# Patient Record
Sex: MALE | Race: BLACK OR AFRICAN AMERICAN | Employment: UNEMPLOYED | URBAN - METROPOLITAN AREA
[De-identification: names, ages, dates, MRNs, and addresses within clinical notes are randomized per-mention and may not be internally consistent; named-entity substitution may affect disease eponyms.]

---

## 2017-06-15 ENCOUNTER — APPOINTMENT (OUTPATIENT)
Dept: CT IMAGING | Age: 52
DRG: 871 | End: 2017-06-15
Attending: EMERGENCY MEDICINE
Payer: SUBSIDIZED

## 2017-06-15 ENCOUNTER — HOSPITAL ENCOUNTER (INPATIENT)
Age: 52
LOS: 7 days | Discharge: HOME | DRG: 871 | End: 2017-06-22
Attending: EMERGENCY MEDICINE | Admitting: INTERNAL MEDICINE
Payer: SUBSIDIZED

## 2017-06-15 ENCOUNTER — APPOINTMENT (OUTPATIENT)
Dept: GENERAL RADIOLOGY | Age: 52
DRG: 871 | End: 2017-06-15
Attending: EMERGENCY MEDICINE
Payer: SUBSIDIZED

## 2017-06-15 DIAGNOSIS — J18.9 COMMUNITY ACQUIRED PNEUMONIA: ICD-10-CM

## 2017-06-15 DIAGNOSIS — R09.02 HYPOXIA: ICD-10-CM

## 2017-06-15 DIAGNOSIS — A41.9 SEPSIS, DUE TO UNSPECIFIED ORGANISM: Primary | ICD-10-CM

## 2017-06-15 PROBLEM — J96.01 ACUTE RESPIRATORY FAILURE WITH HYPOXIA (HCC): Status: ACTIVE | Noted: 2017-06-15

## 2017-06-15 PROBLEM — R79.89 D-DIMER, ELEVATED: Status: ACTIVE | Noted: 2017-06-15

## 2017-06-15 LAB
ALBUMIN SERPL BCP-MCNC: 2.3 G/DL (ref 3.5–5)
ALBUMIN/GLOB SERPL: 0.5 {RATIO} (ref 1.1–2.2)
ALP SERPL-CCNC: 189 U/L (ref 45–117)
ALT SERPL-CCNC: 55 U/L (ref 12–78)
ANION GAP BLD CALC-SCNC: 9 MMOL/L (ref 5–15)
APPEARANCE UR: CLEAR
AST SERPL W P-5'-P-CCNC: 65 U/L (ref 15–37)
ATRIAL RATE: 112 BPM
BACTERIA URNS QL MICRO: NEGATIVE /HPF
BASOPHILS # BLD AUTO: 0 K/UL (ref 0–0.1)
BASOPHILS # BLD: 0 % (ref 0–1)
BILIRUB SERPL-MCNC: 1 MG/DL (ref 0.2–1)
BILIRUB UR QL CFM: NEGATIVE
BUN SERPL-MCNC: 24 MG/DL (ref 6–20)
BUN/CREAT SERPL: 17 (ref 12–20)
CALCIUM SERPL-MCNC: 8.2 MG/DL (ref 8.5–10.1)
CALCULATED P AXIS, ECG09: 44 DEGREES
CALCULATED R AXIS, ECG10: 38 DEGREES
CALCULATED T AXIS, ECG11: 22 DEGREES
CHLORIDE SERPL-SCNC: 92 MMOL/L (ref 97–108)
CO2 SERPL-SCNC: 29 MMOL/L (ref 21–32)
COLOR UR: ABNORMAL
CREAT SERPL-MCNC: 1.42 MG/DL (ref 0.7–1.3)
D DIMER PPP FEU-MCNC: 2.76 MG/L FEU (ref 0–0.65)
DIAGNOSIS, 93000: NORMAL
DIFFERENTIAL METHOD BLD: ABNORMAL
EOSINOPHIL # BLD: 0.2 K/UL (ref 0–0.4)
EOSINOPHIL NFR BLD: 1 % (ref 0–7)
EPITH CASTS URNS QL MICRO: ABNORMAL /LPF
ERYTHROCYTE [DISTWIDTH] IN BLOOD BY AUTOMATED COUNT: 13.6 % (ref 11.5–14.5)
FLUAV AG NPH QL IA: NEGATIVE
FLUBV AG NOSE QL IA: NEGATIVE
GLOBULIN SER CALC-MCNC: 4.8 G/DL (ref 2–4)
GLUCOSE SERPL-MCNC: 114 MG/DL (ref 65–100)
GLUCOSE UR STRIP.AUTO-MCNC: NEGATIVE MG/DL
HCT VFR BLD AUTO: 30.7 % (ref 36.6–50.3)
HGB BLD-MCNC: 11 G/DL (ref 12.1–17)
HGB UR QL STRIP: NEGATIVE
HYALINE CASTS URNS QL MICRO: ABNORMAL /LPF (ref 0–5)
KETONES UR QL STRIP.AUTO: NEGATIVE MG/DL
LACTATE SERPL-SCNC: 1.1 MMOL/L (ref 0.4–2)
LEUKOCYTE ESTERASE UR QL STRIP.AUTO: ABNORMAL
LYMPHOCYTES # BLD AUTO: 4 % (ref 12–49)
LYMPHOCYTES # BLD: 0.8 K/UL (ref 0.8–3.5)
MCH RBC QN AUTO: 31.3 PG (ref 26–34)
MCHC RBC AUTO-ENTMCNC: 35.8 G/DL (ref 30–36.5)
MCV RBC AUTO: 87.2 FL (ref 80–99)
MONOCYTES # BLD: 0.8 K/UL (ref 0–1)
MONOCYTES NFR BLD AUTO: 4 % (ref 5–13)
NEUTS SEG # BLD: 17.3 K/UL (ref 1.8–8)
NEUTS SEG NFR BLD AUTO: 91 % (ref 32–75)
NITRITE UR QL STRIP.AUTO: NEGATIVE
P-R INTERVAL, ECG05: 118 MS
PH UR STRIP: 5 [PH] (ref 5–8)
PLATELET # BLD AUTO: 309 K/UL (ref 150–400)
POTASSIUM SERPL-SCNC: 2.8 MMOL/L (ref 3.5–5.1)
PROT SERPL-MCNC: 7.1 G/DL (ref 6.4–8.2)
PROT UR STRIP-MCNC: NEGATIVE MG/DL
Q-T INTERVAL, ECG07: 314 MS
QRS DURATION, ECG06: 76 MS
QTC CALCULATION (BEZET), ECG08: 428 MS
RBC # BLD AUTO: 3.52 M/UL (ref 4.1–5.7)
RBC #/AREA URNS HPF: ABNORMAL /HPF (ref 0–5)
RBC MORPH BLD: ABNORMAL
SODIUM SERPL-SCNC: 130 MMOL/L (ref 136–145)
SP GR UR REFRACTOMETRY: 1.03 (ref 1–1.03)
UA: UC IF INDICATED,UAUC: ABNORMAL
UROBILINOGEN UR QL STRIP.AUTO: 2 EU/DL (ref 0.2–1)
VENTRICULAR RATE, ECG03: 112 BPM
WBC # BLD AUTO: 19.1 K/UL (ref 4.1–11.1)
WBC MORPH BLD: ABNORMAL
WBC URNS QL MICRO: ABNORMAL /HPF (ref 0–4)

## 2017-06-15 PROCEDURE — 85025 COMPLETE CBC W/AUTO DIFF WBC: CPT | Performed by: EMERGENCY MEDICINE

## 2017-06-15 PROCEDURE — 87040 BLOOD CULTURE FOR BACTERIA: CPT | Performed by: EMERGENCY MEDICINE

## 2017-06-15 PROCEDURE — 96361 HYDRATE IV INFUSION ADD-ON: CPT

## 2017-06-15 PROCEDURE — 74011250636 HC RX REV CODE- 250/636: Performed by: INTERNAL MEDICINE

## 2017-06-15 PROCEDURE — 65660000000 HC RM CCU STEPDOWN

## 2017-06-15 PROCEDURE — 71010 XR CHEST PORT: CPT

## 2017-06-15 PROCEDURE — 77010033678 HC OXYGEN DAILY

## 2017-06-15 PROCEDURE — 85379 FIBRIN DEGRADATION QUANT: CPT | Performed by: EMERGENCY MEDICINE

## 2017-06-15 PROCEDURE — 87804 INFLUENZA ASSAY W/OPTIC: CPT | Performed by: INTERNAL MEDICINE

## 2017-06-15 PROCEDURE — 96374 THER/PROPH/DIAG INJ IV PUSH: CPT

## 2017-06-15 PROCEDURE — 80053 COMPREHEN METABOLIC PANEL: CPT | Performed by: EMERGENCY MEDICINE

## 2017-06-15 PROCEDURE — 81001 URINALYSIS AUTO W/SCOPE: CPT | Performed by: INTERNAL MEDICINE

## 2017-06-15 PROCEDURE — 83605 ASSAY OF LACTIC ACID: CPT | Performed by: EMERGENCY MEDICINE

## 2017-06-15 PROCEDURE — 74011250636 HC RX REV CODE- 250/636: Performed by: EMERGENCY MEDICINE

## 2017-06-15 PROCEDURE — 74011250637 HC RX REV CODE- 250/637: Performed by: INTERNAL MEDICINE

## 2017-06-15 PROCEDURE — 96375 TX/PRO/DX INJ NEW DRUG ADDON: CPT

## 2017-06-15 PROCEDURE — 74011636320 HC RX REV CODE- 636/320: Performed by: EMERGENCY MEDICINE

## 2017-06-15 PROCEDURE — 36415 COLL VENOUS BLD VENIPUNCTURE: CPT | Performed by: EMERGENCY MEDICINE

## 2017-06-15 PROCEDURE — 99285 EMERGENCY DEPT VISIT HI MDM: CPT

## 2017-06-15 PROCEDURE — 93005 ELECTROCARDIOGRAM TRACING: CPT

## 2017-06-15 PROCEDURE — 71275 CT ANGIOGRAPHY CHEST: CPT

## 2017-06-15 PROCEDURE — 74011250637 HC RX REV CODE- 250/637: Performed by: EMERGENCY MEDICINE

## 2017-06-15 PROCEDURE — 74011000258 HC RX REV CODE- 258: Performed by: EMERGENCY MEDICINE

## 2017-06-15 PROCEDURE — 77030027138 HC INCENT SPIROMETER -A

## 2017-06-15 RX ORDER — SODIUM CHLORIDE 9 MG/ML
125 INJECTION, SOLUTION INTRAVENOUS CONTINUOUS
Status: DISCONTINUED | OUTPATIENT
Start: 2017-06-15 | End: 2017-06-17

## 2017-06-15 RX ORDER — SODIUM CHLORIDE 0.9 % (FLUSH) 0.9 %
5-10 SYRINGE (ML) INJECTION AS NEEDED
Status: DISCONTINUED | OUTPATIENT
Start: 2017-06-15 | End: 2017-06-22 | Stop reason: SDUPTHER

## 2017-06-15 RX ORDER — SODIUM CHLORIDE 9 MG/ML
50 INJECTION, SOLUTION INTRAVENOUS
Status: COMPLETED | OUTPATIENT
Start: 2017-06-15 | End: 2017-06-15

## 2017-06-15 RX ORDER — SODIUM CHLORIDE 0.9 % (FLUSH) 0.9 %
5-10 SYRINGE (ML) INJECTION AS NEEDED
Status: DISCONTINUED | OUTPATIENT
Start: 2017-06-15 | End: 2017-06-22 | Stop reason: HOSPADM

## 2017-06-15 RX ORDER — KETOROLAC TROMETHAMINE 30 MG/ML
30 INJECTION, SOLUTION INTRAMUSCULAR; INTRAVENOUS
Status: COMPLETED | OUTPATIENT
Start: 2017-06-15 | End: 2017-06-15

## 2017-06-15 RX ORDER — SODIUM CHLORIDE 0.9 % (FLUSH) 0.9 %
5-10 SYRINGE (ML) INJECTION EVERY 8 HOURS
Status: DISCONTINUED | OUTPATIENT
Start: 2017-06-15 | End: 2017-06-22 | Stop reason: HOSPADM

## 2017-06-15 RX ORDER — IPRATROPIUM BROMIDE AND ALBUTEROL SULFATE 2.5; .5 MG/3ML; MG/3ML
3 SOLUTION RESPIRATORY (INHALATION)
Status: DISCONTINUED | OUTPATIENT
Start: 2017-06-15 | End: 2017-06-22 | Stop reason: HOSPADM

## 2017-06-15 RX ORDER — GUAIFENESIN/DEXTROMETHORPHAN 100-10MG/5
10 SYRUP ORAL
Status: DISCONTINUED | OUTPATIENT
Start: 2017-06-15 | End: 2017-06-22 | Stop reason: HOSPADM

## 2017-06-15 RX ORDER — SODIUM CHLORIDE 0.9 % (FLUSH) 0.9 %
10 SYRINGE (ML) INJECTION
Status: COMPLETED | OUTPATIENT
Start: 2017-06-15 | End: 2017-06-15

## 2017-06-15 RX ORDER — ONDANSETRON 2 MG/ML
4 INJECTION INTRAMUSCULAR; INTRAVENOUS
Status: DISCONTINUED | OUTPATIENT
Start: 2017-06-15 | End: 2017-06-22 | Stop reason: HOSPADM

## 2017-06-15 RX ORDER — ENOXAPARIN SODIUM 100 MG/ML
40 INJECTION SUBCUTANEOUS EVERY 24 HOURS
Status: DISCONTINUED | OUTPATIENT
Start: 2017-06-15 | End: 2017-06-22 | Stop reason: HOSPADM

## 2017-06-15 RX ORDER — ACETAMINOPHEN 325 MG/1
975 TABLET ORAL
Status: COMPLETED | OUTPATIENT
Start: 2017-06-15 | End: 2017-06-15

## 2017-06-15 RX ORDER — POTASSIUM CHLORIDE 20 MEQ/1
40 TABLET, EXTENDED RELEASE ORAL
Status: COMPLETED | OUTPATIENT
Start: 2017-06-15 | End: 2017-06-15

## 2017-06-15 RX ORDER — ACETAMINOPHEN 325 MG/1
650 TABLET ORAL
Status: DISCONTINUED | OUTPATIENT
Start: 2017-06-15 | End: 2017-06-22 | Stop reason: HOSPADM

## 2017-06-15 RX ORDER — GUAIFENESIN 600 MG/1
600 TABLET, EXTENDED RELEASE ORAL EVERY 12 HOURS
Status: DISCONTINUED | OUTPATIENT
Start: 2017-06-15 | End: 2017-06-22 | Stop reason: HOSPADM

## 2017-06-15 RX ORDER — HYDROCODONE BITARTRATE AND ACETAMINOPHEN 5; 325 MG/1; MG/1
1 TABLET ORAL
Status: DISCONTINUED | OUTPATIENT
Start: 2017-06-15 | End: 2017-06-21

## 2017-06-15 RX ADMIN — IOPAMIDOL 80 ML: 755 INJECTION, SOLUTION INTRAVENOUS at 06:42

## 2017-06-15 RX ADMIN — Medication 10 ML: at 13:44

## 2017-06-15 RX ADMIN — GUAIFENESIN 600 MG: 600 TABLET, EXTENDED RELEASE ORAL at 22:26

## 2017-06-15 RX ADMIN — GUAIFENESIN 600 MG: 600 TABLET, EXTENDED RELEASE ORAL at 09:12

## 2017-06-15 RX ADMIN — SODIUM CHLORIDE 100 ML/HR: 900 INJECTION, SOLUTION INTRAVENOUS at 06:53

## 2017-06-15 RX ADMIN — ACETAMINOPHEN 975 MG: 325 TABLET, FILM COATED ORAL at 05:45

## 2017-06-15 RX ADMIN — AZITHROMYCIN MONOHYDRATE 500 MG: 500 INJECTION, POWDER, LYOPHILIZED, FOR SOLUTION INTRAVENOUS at 06:59

## 2017-06-15 RX ADMIN — POTASSIUM CHLORIDE 40 MEQ: 20 TABLET, EXTENDED RELEASE ORAL at 06:59

## 2017-06-15 RX ADMIN — POTASSIUM CHLORIDE 40 MEQ: 20 TABLET, EXTENDED RELEASE ORAL at 09:12

## 2017-06-15 RX ADMIN — Medication 10 ML: at 22:26

## 2017-06-15 RX ADMIN — SODIUM CHLORIDE 50 ML/HR: 900 INJECTION, SOLUTION INTRAVENOUS at 06:41

## 2017-06-15 RX ADMIN — ENOXAPARIN SODIUM 40 MG: 40 INJECTION SUBCUTANEOUS at 09:12

## 2017-06-15 RX ADMIN — CEFTRIAXONE SODIUM 2 G: 2 INJECTION, POWDER, FOR SOLUTION INTRAMUSCULAR; INTRAVENOUS at 06:16

## 2017-06-15 RX ADMIN — KETOROLAC TROMETHAMINE 30 MG: 30 INJECTION, SOLUTION INTRAMUSCULAR at 05:45

## 2017-06-15 RX ADMIN — SODIUM CHLORIDE 125 ML/HR: 900 INJECTION, SOLUTION INTRAVENOUS at 15:30

## 2017-06-15 RX ADMIN — Medication 10 ML: at 06:41

## 2017-06-15 RX ADMIN — HYDROCODONE BITARTRATE AND ACETAMINOPHEN 1 TABLET: 5; 325 TABLET ORAL at 18:19

## 2017-06-15 RX ADMIN — HYDROCODONE BITARTRATE AND ACETAMINOPHEN 1 TABLET: 5; 325 TABLET ORAL at 22:26

## 2017-06-15 RX ADMIN — SODIUM CHLORIDE 1980 ML: 900 INJECTION, SOLUTION INTRAVENOUS at 05:20

## 2017-06-15 NOTE — IP AVS SNAPSHOT
[address] [facility] 
[phone] Patient: Estevan Mast MRN: [id number] :1965 You are allergic to the following No active allergies Recent Documentation Height Weight BMI  
  
  
 1.727 m 64.5 kg 21.62 kg/m2 Unresulted Labs Order Current Status CULTURE, BODY FLUID W GRAM STAIN Preliminary result About your hospitalization You were admitted on:  Loni 15, 2017 You last received care in the:  [facility] 2 GENERAL SURGERY You were discharged on:  2017 Unit phone number:  [phone] Why you were hospitalized Your primary diagnosis was:  Not on File Your diagnoses also included:  Sepsis (Hcc), Acute Respiratory Failure With Hypoxia (Hcc), Community Acquired Pneumonia, D-Dimer, Elevated, Pleural Effusion, Left Providers Seen During Your Hospitalizations Provider Role Specialty Primary office phone Ritu Yancey MD Attending Provider Emergency Medicine [phone] Brandon Forbes MD Attending Provider Internal Medicine [phone] Calista Mccormack MD Attending Provider Internal Medicine [phone] Your Primary Care Physician (PCP) Primary Care Physician Office Phone Office Fax NONE ** None ** ** None ** Follow-up Information Follow up With Details Comments Contact Info None   None ([phone]) Patient stated that they have no PCP Current Discharge Medication List  
  
START taking these medications Dose & Instructions Dispensing Information Comments Morning Noon Evening Bedtime  
 guaiFENesin  mg ER tablet Commonly known as:  Sonido & Sonido Your last dose was: Your next dose is:    
   
   
 Dose:  600 mg Take 1 Tab by mouth every twelve (12) hours for 7 days. For cough; do not have finish this course Quantity:  14 Tab Refills:  0 HYDROcodone-acetaminophen 5-325 mg per tablet Commonly known as:  Burnett Littler Your last dose was: Your next dose is:    
   
   
 Dose:  1-2 Tab Take 1-2 Tabs by mouth every four (4) hours as needed. Max Daily Amount: 12 Tabs. Quantity:  10 Tab Refills:  0  
     
   
   
   
  
 L. acidoph & paracasei- S therm- Bifido 8 billion cell Cap cap Commonly known as:  NORBERTO-Q/RISAQUAD Your last dose was: Your next dose is:    
   
   
 Dose:  1 Cap Take 1 Cap by mouth daily. Quantity:  10 Cap Refills:  0  
     
   
   
   
  
 levoFLOXacin 750 mg tablet Commonly known as:  Mellody Plump Your last dose was: Your next dose is:    
   
   
 Dose:  750 mg Take 1 Tab by mouth daily. Quantity:  10 Tab Refills:  0  
     
   
   
   
  
 metroNIDAZOLE 500 mg tablet Commonly known as:  FLAGYL Your last dose was: Your next dose is:    
   
   
 Dose:  500 mg Take 1 Tab by mouth three (3) times daily for 10 days. Quantity:  30 Tab Refills:  0 Where to Get Your Medications Information on where to get these meds will be given to you by the nurse or doctor. ! Ask your nurse or doctor about these medications  
  guaiFENesin  mg ER tablet HYDROcodone-acetaminophen 5-325 mg per tablet L. acidoph & paracasei- S therm- Bifido 8 billion cell Cap cap  
 levoFLOXacin 750 mg tablet  
 metroNIDAZOLE 500 mg tablet Discharge Instructions HOSPITALIST DISCHARGE INSTRUCTIONS 
 
NAME: Jyoti Hernandez :  1965 MRN:  [id number] Date/Time:  2017 11:30 AM 
 
ADMIT DATE: 6/15/2017 DISCHARGE DATE: 2017 Attending Physician: Rekha Otero MD 
 
DISCHARGE DIAGNOSIS: 
PNA community acquired Empyema L with pleural effusion S/p sepsis S/p acute resp failure Pleuritic chest pain S/p acute renal failure MEDICATIONS: 
See above · It is important that you take the medication exactly as they are prescribed. · Keep your medication in the bottles provided by the pharmacist and keep a list of the medication names, dosages, and times to be taken in your wallet. · Do not take other medications without consulting your doctor. Pain Management: per above medications What to do at [facility] Recommended diet:  Regular Diet Recommended activity: Activity as tolerated Follow Up: Follow-up Information None  
  
you need to follow up with a PCP in 7-10 days. Please choose one. If you have questions regarding the hospital related prescriptions or hospital related issues please call [facility] at . You can always direct your questions to your primary care doctor if you are unable to reach your hospital physician; your PCP works as an extension of your hospital doctor just like your hospital doctor is an extension of your PCP for your time at [address] If you experience any of the following symptoms then please call your primary care physician or return to the emergency room if you cannot get hold of your doctor: 
Fever, chills, nausea, vomiting, diarrhea, change in mentation, falling, bleeding, shortness of breath Additional Instructions: 
 
 
Bring these papers with you to your follow up appointments. The papers will help your doctors be sure to continue the care plan from the hospital. 
 
 
 
 
 
 
Information obtained by : 
I understand that if any problems occur once I am at home I am to contact my physician. I understand and acknowledge receipt of the instructions indicated above. Physician's or R.N.'s Signature                                                                  Date/Time Patient or Representative Signature                                                          Da 
 
Discharge Orders None [employer] Announcement We are excited to announce that we are making your provider's discharge notes available to you in [employer]. You will see these notes when they are completed and signed by the physician that discharged you from your recent hospital stay. If you have any questions or concerns about any information you see in [employer], please call the Health Information Department where you were seen or reach out to your Primary Care Provider for more information about your plan of care. Introducing [facility] [place] [facility]! [employer] introduces [employer] patient portal. Now you can access parts of your medical record, email your doctor's office, and request medication refills online. 1. In your internet browser, go to [email or web address] 2. Click on the First Time User? Click Here link in the Sign In box. You will see the New Member Sign Up page. 3. Enter your [employer] Access Code exactly as it appears below. You will not need to use this code after youve completed the sign-up process. If you do not sign up before the expiration date, you must request a new code. · [employer] Access Code: [email or web address] Expires: 9/20/2017 12:32 PM 
 
4. Enter the last four digits of your Social Security Number (xxxx) and Date of Birth (mm/dd/yyyy) as indicated and click Submit. You will be taken to the next sign-up page. 5. Create a [employer] ID. This will be your [employer] login ID and cannot be changed, so think of one that is secure and easy to remember. 6. Create a [employer] password. You can change your password at any time. 7. Enter your Password Reset Question and Answer. This can be used at a later time if you forget your password. 8. Enter your e-mail address. You will receive e-mail notification when new information is available in [address] 9. Click Sign Up. You can now view and download portions of your medical record. 10. Click the Download Summary menu link to download a portable copy of your medical information. If you have questions, please visit the Frequently Asked Questions section of the [employer] website. Remember, [employer] is NOT to be used for urgent needs. For medical emergencies, dial 911. Now available from your iPhone and Android! General Information Please provide this summary of care documentation to your next provider. Patient Signature:  ____________________________________________________________ Date:  ____________________________________________________________  
  
Regine Berumenan Provider Signature:  ____________________________________________________________ Date:  ____________________________________________________________

## 2017-06-15 NOTE — PROGRESS NOTES
End of Shift Nursing Note    Bedside shift change report given to [address] [place] [address] (oncoming nurse) by Lilli Bridges. Uriel Cage RN (offgoing nurse). Report included the following information SBAR. [facility] Phone:   [phone]    Significant changes during shift:    none   Non-emergent issues for physician to address:   none     Number times ambulated in hallway past shift: in room      Number of times OOB to chair past shift: 0    POD #: none, ER admit today     Vital Signs:    Temp: 97.5 °F (36.4 °C)     Pulse (Heart Rate): 66     BP: (!) 87/51     Resp Rate: 16     O2 Sat (%): 99 %    Lines & Drains:     Urinary Catheter? No     Central Line? No     PICC Line? No       NG tube [] in [] removed [x] not applicable   Drains [] in [] removed [x] not applicable     Skin Integrity:      Wounds: no   Dressings Present: no    Wound Concerns: no      GI:    Current diet:  DIET REGULAR    Nausea: NO  Vomiting: NO  Bowel Sounds: YES  Flatus: YES  Last Bowel Movement: 6/13/17   Appearance: soft    Respiratory:  Supplemental O2: Yes      Device: NC   via 6 Liters/min     Incentive Spirometer: YES  Volume: fair  Coughing and Deep Breathing: YES  Oral Care: YES  Understanding (patient/family education): YES   Getting out of bed: YES  Head of bed elevation: YES    Patient Safety:    Falls Score: 1  Mobility Score: 1  Bed Alarm On? No  Sitter? No      Opportunity for questions and clarification was given to oncoming nurse. Patient bed is in supine position, side rails are up x 2, door & observation blinds open as needed, call bell within reach and patient not in distress.     Lieutenant Raul RN

## 2017-06-15 NOTE — PROGRESS NOTES
TRANSFER - IN REPORT:    Verbal report received from Maria Dolores Rn(name) on Jillian Akira  being received from ER(unit) for routine progression of care      Report consisted of patients Situation, Background, Assessment and   Recommendations(SBAR). Information from the following report(s) SBAR was reviewed with the receiving nurse. Opportunity for questions and clarification was provided. Assessment completed upon patients arrival to unit and care assumed.

## 2017-06-15 NOTE — H&P
Hospitalist Admission Note    NAME: Mehnaz King   :  1965   MRN:  [id number]     Date/Time:  6/15/2017 6:24 AM    Patient PCP: No primary care provider on file.  ________________________________________________________________________    My assessment of this patient's clinical condition and my plan of care is as follows. Assessment / Plan:  Sepsis with fever, leukocytosis and tachycardia POA  Acute respiratory failure with hypoxia  Pleuritic chest pain  Due to CAP  IV Rocephin and Zithromax started in ED, will continue  Check Sputum cultures  F/u blood cultures  IVF  LA normal  O2 support  PRN nebs  PRN Norco  UA pending    Mild NARINDER  Hyponatremia  Seems related to dehydration  IVF and monitor lytes    D-dimer, elevated  ED ordered CTA chest, GFR is ok considering non AAM    Code Status: Full  Surrogate Decision Maker: Wife    DVT Prophylaxis: Lovenox  GI Prophylaxis: not indicated    Baseline: functional        Subjective:   CHIEF COMPLAINT: pleuritic chest pain    HISTORY OF PRESENT ILLNESS:     Mehnaz King is a 47 y.o.  male who presents with pleuritic chest pain. As per patient, he is been feeling sick for past 2 weeks. He claims to have started with cough which is productive with brown sputum. He also reports 8/10 pain upon deep breathing, hurting and lower chest, non radiating, sharp to aching in nature. Pt also reports subjective fevers, chills, sweats at home. Pt denies any nausea, vomiting, diarrhea, abdominal pain. In ED pt noted to have LLL PNA on CXR, fever, tachycardia and leukocytosis. We were asked to admit for work up and evaluation of the above problems.      Past medical history: he is unaware about any medical problems as he doesn't follow any doctors    Past surgical history: he denies any history of surgeries in the past    Social History   Substance Use Topics    Smoking status: Claims to have quit and now smokes cigar    Smokeless tobacco: Not on file    Alcohol use denies        Family history: negative for lung problems    No Known Allergies     Prior to Admission medications    Not on File       REVIEW OF SYSTEMS:     I am not able to complete the review of systems because: The patient is intubated and sedated    The patient has altered mental status due to his acute medical problems    The patient has baseline aphasia from prior stroke(s)    The patient has baseline dementia and is not reliable historian    The patient is in acute medical distress and unable to provide information           Total of 12 systems reviewed as follows:       POSITIVE= underlined text  Negative = text not underlined  General:  fever, chills, sweats, generalized weakness, weight loss/gain,      loss of appetite   Eyes:    blurred vision, eye pain, loss of vision, double vision  ENT:    rhinorrhea, pharyngitis   Respiratory:   cough, sputum production, SOB, BERG, wheezing, pleuritic pain   Cardiology:   chest pain, palpitations, orthopnea, PND, edema, syncope   Gastrointestinal:  abdominal pain , N/V, diarrhea, dysphagia, constipation, bleeding   Genitourinary:  frequency, urgency, dysuria, hematuria, incontinence   Muskuloskeletal :  arthralgia, myalgia, back pain  Hematology:  easy bruising, nose or gum bleeding, lymphadenopathy   Dermatological: rash, ulceration, pruritis, color change / jaundice  Endocrine:   hot flashes or polydipsia   Neurological:  headache, dizziness, confusion, focal weakness, paresthesia,     Speech difficulties, memory loss, gait difficulty  Psychological: Feelings of anxiety, depression, agitation    Objective:   VITALS:    Visit Vitals    /60    Pulse 99    Temp (!) 103.2 °F (39.6 °C)    Resp 15    Ht 5' 8\" (1.727 m)    Wt 64.5 kg (142 lb 3.2 oz)    SpO2 93%    BMI 21.62 kg/m2       PHYSICAL EXAM:    General:    Alert, cooperative, no distress, appears stated age.      HEENT: Atraumatic, anicteric sclerae, pink conjunctivae     No oral ulcers, mucosa dry, throat clear, dentition fair  Neck:  Supple, symmetrical,  thyroid: non tender  Lungs:   Crackles at the basis. No Wheezing or Rhonchi. No rales. Chest wall:  No tenderness  No Accessory muscle use. Heart:   Regular  rhythm,  No  murmur   No edema  Abdomen:   Soft, non-tender. Not distended. Bowel sounds normal  Extremities: No cyanosis. No clubbing,      Skin turgor normal, Capillary refill normal, Radial dial pulse 2+  Skin:     Not pale. Not Jaundiced  No rashes   Psych:  Good insight. Not depressed. Not anxious or agitated. Neurologic: EOMs intact. No facial asymmetry. No aphasia or slurred speech. Symmetrical strength, Sensation grossly intact. Alert and oriented X 4.     _______________________________________________________________________  Care Plan discussed with:    Comments   Patient y    Family      RN y    Care Manager                    Consultant:  karina ED physician   _______________________________________________________________________  Expected  Disposition:   Home with Family y   HH/PT/OT/RN    SNF/LTC    RHIANNA    ________________________________________________________________________  TOTAL TIME: 61 Minutes    Critical Care Provided     Minutes non procedure based      Comments    y Reviewed previous records   >50% of visit spent in counseling and coordination of care y Discussion with patient and questions answered       ________________________________________________________________________  Signed: Jarrod Boyd MD    Procedures: see electronic medical records for all procedures/Xrays and details which were not copied into this note but were reviewed prior to creation of Plan.     LAB DATA REVIEWED:    Recent Results (from the past 24 hour(s))   LACTIC ACID, PLASMA    Collection Time: 06/15/17  5:21 AM   Result Value Ref Range    Lactic acid 1.1 0.4 - 2.0 MMOL/L   CBC WITH AUTOMATED DIFF    Collection Time: 06/15/17  5:21 AM   Result Value Ref Range    WBC 19.1 (H) 4.1 - 11.1 K/uL    RBC 3.52 (L) 4.10 - 5.70 M/uL    HGB 11.0 (L) 12.1 - 17.0 g/dL    HCT 30.7 (L) 36.6 - 50.3 %    MCV 87.2 80.0 - 99.0 FL    MCH 31.3 26.0 - 34.0 PG    MCHC 35.8 30.0 - 36.5 g/dL    RDW 13.6 11.5 - 14.5 %    PLATELET 344 574 - 715 K/uL    NEUTROPHILS PENDING %    LYMPHOCYTES PENDING %    MONOCYTES PENDING %    EOSINOPHILS PENDING %    BASOPHILS PENDING %    ABS. NEUTROPHILS PENDING K/UL    ABS. LYMPHOCYTES PENDING K/UL    ABS. MONOCYTES PENDING K/UL    ABS. EOSINOPHILS PENDING K/UL    ABS.  BASOPHILS PENDING K/UL    DF PENDING    D DIMER    Collection Time: 06/15/17  5:21 AM   Result Value Ref Range    D-dimer 2.76 (H) 0.00 - 0.65 mg/L FEU

## 2017-06-15 NOTE — ED NOTES
Bedside shift change report given to [address] [place] Ann (oncoming nurse) by Angel Case (offgoing nurse). Report included the following information SBAR, Kardex, MAR, Recent Results and Cardiac Rhythm NSR.

## 2017-06-15 NOTE — ED NOTES
Bedside shift change report given to Jack Ennis (oncoming nurse) received from Estuardo Meek, [facility] (offgoing nurse). Report included the following information SBAR, Kardex, ED Summary, Procedure Summary, MAR and Recent Results. Assumed care of Pt. Pt in bed resting comfortably at this time.

## 2017-06-15 NOTE — ED TRIAGE NOTES
Chief Complaint: left sided rib pain for 2 weeks with intermittent fever. Associated with cough that brings me to my knees.    Patient states \"rib pain couple weeks  Onset 2 weeks  Pt arrived via EMS

## 2017-06-15 NOTE — IP AVS SNAPSHOT
Current Discharge Medication List  
  
START taking these medications Dose & Instructions Dispensing Information Comments Morning Noon Evening Bedtime  
 guaiFENesin  mg ER tablet Commonly known as:  Sonido & Sonido Your last dose was: Your next dose is:    
   
   
 Dose:  600 mg Take 1 Tab by mouth every twelve (12) hours for 7 days. For cough; do not have finish this course Quantity:  14 Tab Refills:  0 HYDROcodone-acetaminophen 5-325 mg per tablet Commonly known as:  Lanny Gowers Your last dose was: Your next dose is:    
   
   
 Dose:  1-2 Tab Take 1-2 Tabs by mouth every four (4) hours as needed. Max Daily Amount: 12 Tabs. Quantity:  10 Tab Refills:  0  
     
   
   
   
  
 L. acidoph & paracasei- S therm- Bifido 8 billion cell Cap cap Commonly known as:  NORBERTO-Q/RISAQUAD Your last dose was: Your next dose is:    
   
   
 Dose:  1 Cap Take 1 Cap by mouth daily. Quantity:  10 Cap Refills:  0  
     
   
   
   
  
 levoFLOXacin 750 mg tablet Commonly known as:  Cordelia Gordon Your last dose was: Your next dose is:    
   
   
 Dose:  750 mg Take 1 Tab by mouth daily. Quantity:  10 Tab Refills:  0  
     
   
   
   
  
 metroNIDAZOLE 500 mg tablet Commonly known as:  FLAGYL Your last dose was: Your next dose is:    
   
   
 Dose:  500 mg Take 1 Tab by mouth three (3) times daily for 10 days. Quantity:  30 Tab Refills:  0 Where to Get Your Medications Information on where to get these meds will be given to you by the nurse or doctor. ! Ask your nurse or doctor about these medications  
  guaiFENesin  mg ER tablet HYDROcodone-acetaminophen 5-325 mg per tablet L. acidoph & paracasei- S therm- Bifido 8 billion cell Cap cap  
 levoFLOXacin 750 mg tablet  
 metroNIDAZOLE 500 mg tablet

## 2017-06-15 NOTE — PROGRESS NOTES
Primary Nurse Kelsey Block RN and Vazquez Monson RN performed a dual skin assessment on this patient No impairment noted  Kirt score is 23. Patient oriented to room. Admission complete.

## 2017-06-15 NOTE — PROGRESS NOTES
Pt was admitted for sepsis. Pt reported that he resides with a friend. Pt reported that he is independent with ADLs, and does not drive. Pt reported that he may not have transportation home. Pt reported that he does not have a PCP: list given to pt by CM. Pt reported no HH, SNF, and DME. Pt was [facility] to [facility] for his self pay status. Care Management Interventions  PCP Verified by CM: Yes  Mode of Transport at Discharge: Other (see comment)  Transition of Care Consult (CM Consult): Discharge Planning  Discharge Durable Medical Equipment: No  Physical Therapy Consult: No  Occupational Therapy Consult: No  Speech Therapy Consult: No  Current Support Network:  Other, Own Home  Confirm Follow Up Transport: Family  Plan discussed with Pt/Family/Caregiver: Yes  Discharge Location  Discharge Placement: [facility] [address], MSW   [phone]

## 2017-06-15 NOTE — ED PROVIDER NOTES
HPI Comments: Desiree Diaz, 47 y.o. Male who is homeless, presents ambulatory to ED [facility] ED with cc of intermittent, left-sided chest pain, which wraps around to his back x2 weeks. He reports inhalation exacerbates this pain. He states he uses an inhaler as needed, but did not use one today. He also reports a cough that \"brings me to my knees,\" subjective fever with sweating, and intermittent vomiting for the past few days. Denies diarrhea. PCP: No primary care provider on file. Social history significant for: + Tobacco, + EtOH, - Illicit Drug Use    There are no other complaints, changes, or physical findings at this time. The history is provided by the patient. No past medical history on file. No past surgical history on file. No family history on file. Social History     Social History    Marital status: SINGLE     Spouse name: N/A    Number of children: N/A    Years of education: N/A     Occupational History    Not on file. Social History Main Topics    Smoking status: Not on file    Smokeless tobacco: Not on file    Alcohol use Not on file    Drug use: Not on file    Sexual activity: Not on file     Other Topics Concern    Not on file     Social History Narrative         ALLERGIES: Review of patient's allergies indicates no known allergies. Review of Systems   Constitutional: Positive for diaphoresis and fever (subjective). Negative for chills. HENT: Negative for congestion, rhinorrhea, sneezing and sore throat. Eyes: Negative for redness and visual disturbance. Respiratory: Positive for cough and shortness of breath. Cardiovascular: Positive for chest pain. Negative for leg swelling. Gastrointestinal: Positive for nausea and vomiting. Negative for abdominal pain and diarrhea. Genitourinary: Negative for difficulty urinating and frequency. Musculoskeletal: Positive for back pain. Negative for myalgias and neck stiffness. Skin: Negative for rash. Neurological: Negative for dizziness, syncope, weakness and headaches. Hematological: Negative for adenopathy. Patient Vitals for the past 12 hrs:   Temp Pulse Resp BP SpO2   06/15/17 0615 - 99 15 107/60 93 %   06/15/17 0601 (!) 103.2 °F (39.6 °C) - - - -   06/15/17 0600 - 99 26 119/65 92 %   06/15/17 0558 - (!) 101 16 - 93 %   06/15/17 0545 - (!) 107 15 110/57 91 %   06/15/17 0538 - (!) 105 17 108/58 93 %   06/15/17 0531 - (!) 109 20 - (!) 89 %   06/15/17 0530 - (!) 110 17 - 91 %   06/15/17 0511 - (!) 118 21 - 96 %   06/15/17 0507 - (!) 119 27 - 92 %   06/15/17 0505 - (!) 116 21 - (!) 84 %   06/15/17 0504 (!) 103.2 °F (39.6 °C) (!) 117 18 129/57 (!) 82 %   06/15/17 0503 - - - 129/57 -       Physical Exam   Constitutional: He is oriented to person, place, and time. He appears well-developed and well-nourished. Nasal cannula in place. Hypoxic   HENT:   Head: Normocephalic and atraumatic. Mouth/Throat: Oropharynx is clear and moist. Mucous membranes are dry. Eyes: EOM are normal.   Neck: Normal range of motion and full passive range of motion without pain. Neck supple. Cardiovascular: Regular rhythm, normal heart sounds, intact distal pulses and normal pulses. Tachycardia present. No murmur heard. Pulmonary/Chest: Tachypnea noted. No respiratory distress. He has decreased breath sounds (on the left). He exhibits no tenderness. On 6L O2 nasal cannula during exam.   Abdominal: Soft. Normal appearance and bowel sounds are normal. There is no tenderness. There is no rebound and no guarding. Neurological: He is alert and oriented to person, place, and time. He has normal strength. Skin: Skin is warm, dry and intact. No rash noted. No erythema. Psychiatric: He has a normal mood and affect. His speech is normal and behavior is normal. Judgment and thought content normal.   Nursing note and vitals reviewed.        MDM  Number of Diagnoses or Management Options  Community acquired pneumonia: Hypoxia:   Sepsis, due to unspecified organism [facility]):   Diagnosis management comments: DDx: Pneumonia, PE, Sepsis, Viral illness, ACS       Amount and/or Complexity of Data Reviewed  Clinical lab tests: ordered and reviewed  Tests in the radiology section of CPT®: reviewed and ordered  Tests in the medicine section of CPT®: ordered and reviewed  Review and summarize past medical records: yes  Discuss the patient with other providers: yes (Hospitalist)  Independent visualization of images, tracings, or specimens: yes      ED Course       Procedures    ED EKG interpretation:  Rhythm: sinus tachycardia; and regular . Rate (approx.): 112; Axis: normal; P wave: normal; QRS interval: normal ; ST/T wave: normal; Other findings: normal. This EKG was interpreted by Zbigniew Oleary MD,ED Provider. Consult Note:  6:04 AM  Zbigniew Oleary MD spoke with Dr. Timothy Weiner,  Specialty: Hospitalist  Discussed pt's hx, disposition, and available diagnostic and imaging results. Reviewed care plans. Consultant agrees with plans as outlined. He is aware of the patient. A hospitalist will come and evaluate the patient for admission. LABORATORY TESTS:  Recent Results (from the past 12 hour(s))   LACTIC ACID, PLASMA    Collection Time: 06/15/17  5:21 AM   Result Value Ref Range    Lactic acid 1.1 0.4 - 2.0 MMOL/L   CBC WITH AUTOMATED DIFF    Collection Time: 06/15/17  5:21 AM   Result Value Ref Range    WBC 19.1 (H) 4.1 - 11.1 K/uL    RBC 3.52 (L) 4.10 - 5.70 M/uL    HGB 11.0 (L) 12.1 - 17.0 g/dL    HCT 30.7 (L) 36.6 - 50.3 %    MCV 87.2 80.0 - 99.0 FL    MCH 31.3 26.0 - 34.0 PG    MCHC 35.8 30.0 - 36.5 g/dL    RDW 13.6 11.5 - 14.5 %    PLATELET 879 696 - 740 K/uL    NEUTROPHILS 91 (H) 32 - 75 %    LYMPHOCYTES 4 (L) 12 - 49 %    MONOCYTES 4 (L) 5 - 13 %    EOSINOPHILS 1 0 - 7 %    BASOPHILS 0 0 - 1 %    ABS. NEUTROPHILS 17.3 (H) 1.8 - 8.0 K/UL    ABS. LYMPHOCYTES 0.8 0.8 - 3.5 K/UL    ABS. MONOCYTES 0.8 0.0 - 1.0 K/UL    ABS. EOSINOPHILS 0.2 0.0 - 0.4 K/UL    ABS. BASOPHILS 0.0 0.0 - 0.1 K/UL    DF SMEAR SCANNED      RBC COMMENTS NORMOCYTIC, NORMOCHROMIC      WBC COMMENTS TOXIC GRANULATION     D DIMER    Collection Time: 06/15/17  5:21 AM   Result Value Ref Range    D-dimer 2.76 (H) 0.00 - 0.65 mg/L FEU   METABOLIC PANEL, COMPREHENSIVE    Collection Time: 06/15/17  5:52 AM   Result Value Ref Range    Sodium 130 (L) 136 - 145 mmol/L    Potassium 2.8 (L) 3.5 - 5.1 mmol/L    Chloride 92 (L) 97 - 108 mmol/L    CO2 29 21 - 32 mmol/L    Anion gap 9 5 - 15 mmol/L    Glucose 114 (H) 65 - 100 mg/dL    BUN 24 (H) 6 - 20 MG/DL    Creatinine 1.42 (H) 0.70 - 1.30 MG/DL    BUN/Creatinine ratio 17 12 - 20      GFR est AA >60 >60 ml/min/1.73m2    GFR est non-AA 53 (L) >60 ml/min/1.73m2    Calcium 8.2 (L) 8.5 - 10.1 MG/DL    Bilirubin, total 1.0 0.2 - 1.0 MG/DL    ALT (SGPT) 55 12 - 78 U/L    AST (SGOT) 65 (H) 15 - 37 U/L    Alk. phosphatase 189 (H) 45 - 117 U/L    Protein, total 7.1 6.4 - 8.2 g/dL    Albumin 2.3 (L) 3.5 - 5.0 g/dL    Globulin 4.8 (H) 2.0 - 4.0 g/dL    A-G Ratio 0.5 (L) 1.1 - 2.2         IMAGING RESULTS:  XR CHEST PORT   Final Result   History: Sepsis     Comparison: None     Findings: There is patchy opacification in the left lower lobe. No pleural  effusion or pneumothorax. The heart is normal size. The visualized osseous  structures are unremarkable.     IMPRESSION  Impression:  Atelectasis versus developing airspace disease in the left lower lobe.    CTA CHEST W OR W WO CONT    (Results Pending)       MEDICATIONS GIVEN:  Medications   sodium chloride (NS) flush 5-10 mL (not administered)   cefTRIAXone (ROCEPHIN) 2 g in 0.9% sodium chloride (MBP/ADV) 50 mL (2 g IntraVENous New Bag 6/15/17 0616)   azithromycin (ZITHROMAX) 500 mg in 0.9% sodium chloride (MBP/ADV) 250 mL (not administered)   sodium chloride (NS) flush 10 mL (not administered)   iopamidol (ISOVUE-370) 76 % injection 80 mL (not administered)   0.9% sodium chloride infusion (not administered)   0.9% sodium chloride infusion (not administered)   potassium chloride SR (KLOR-CON 10) tablet 40 mEq (not administered)   acetaminophen (TYLENOL) tablet 975 mg (975 mg Oral Given 6/15/17 0545)   sodium chloride 0.9 % bolus infusion 1,980 mL (1,980 mL IntraVENous New Bag 6/15/17 0520)   ketorolac (TORADOL) injection 30 mg (30 mg IntraVENous Given 6/15/17 0545)       IMPRESSION:  1. Sepsis, due to unspecified organism ([address]    2. Community acquired pneumonia    3. Hypoxia        PLAN:  1. Admit to Hospitalist.    Admit Note:  6:29 AM  Pt is being admitted by Dr. Hugo Christianson. The results of their tests and reason(s) for their admission have been discussed with pt and/or available family. They convey agreement and understanding for the need to be admitted and for admission diagnosis. This note is prepared by Natty Rivera, acting as a Scribe for Bettye Marks MD.    Bettye Marks MD: The scribe's documentation has been prepared under my direction and personally reviewed by me in its entirety. I confirm that the notes above accurately reflects all work, treatment, procedures, and medical decision making performed by me.

## 2017-06-15 NOTE — IP AVS SNAPSHOT
Summary of Care Report The Summary of Care report has been created to help improve care coordination. Users with access to [employer] or [employer] (Web-based application) may access additional patient information including the Discharge Summary. If you are not currently a [employer] user and need more information, please call the number listed below in the [address] section and ask to be connected with Medical Records. Facility Information Name Address Phone [address] [address] [phone] Patient Information Patient Name Sex  Amy Aragon ([id number]) Male 1965 Discharge Information Admitting Provider Service Area Unit Joseluis Mcfarlane MD / [facility] 2 General Surgery / [phone] Discharge Provider Discharge Date/Time Discharge Disposition Destination (none) 2017 (Pending) AHR (none) Patient Language Language ENGLISH [13] Hospital Problems as of 2017  Reviewed: 2017  2:58 PM by Sidney Vasquez MD  
  
  
  
 Class Noted - Resolved Last Modified POA Active Problems Sepsis ([address]  6/15/2017 - Present 6/15/2017 by Sidney Vasquez MD Yes Entered by Joseluis Mcfarlane MD  
  Acute respiratory failure with hypoxia ([address]  6/15/2017 - Present 6/15/2017 by Sidney Vasquez MD Yes Entered by Joseluis Mcfarlane MD  
  Community acquired pneumonia  6/15/2017 - Present 6/15/2017 by Sidney Vasquez MD Yes Entered by Joseluis Mcfarlane MD  
  D-dimer, elevated  6/15/2017 - Present 6/15/2017 by Sidney Vasquez MD Yes Entered by Joseluis Mcfarlane MD  
  Pleural effusion, left  2017 - Present 2017 by Sidney Vasquez MD Yes   Entered by Sidney Vasquez MD  
  
Non-Hospital Problems as of 2017  Reviewed: 2017  2:58 PM by Zulema Cardenas MD  
 None You are allergic to the following No active allergies Current Discharge Medication List  
  
START taking these medications Dose & Instructions Dispensing Information Comments  
 guaiFENesin  mg ER tablet Commonly known as:  Sonido & Sonido Dose:  600 mg Take 1 Tab by mouth every twelve (12) hours for 7 days. For cough; do not have finish this course Quantity:  14 Tab Refills:  0 HYDROcodone-acetaminophen 5-325 mg per tablet Commonly known as:  Kim Dus Dose:  1-2 Tab Take 1-2 Tabs by mouth every four (4) hours as needed. Max Daily Amount: 12 Tabs. Quantity:  10 Tab Refills:  0  
   
 L. acidoph & paracasei- S therm- Bifido 8 billion cell Cap cap Commonly known as:  NORBERTO-Q/RISAQUAD Dose:  1 Cap Take 1 Cap by mouth daily. Quantity:  10 Cap Refills:  0  
   
 levoFLOXacin 750 mg tablet Commonly known as:  Elester Stalls Dose:  750 mg Take 1 Tab by mouth daily. Quantity:  10 Tab Refills:  0  
   
 metroNIDAZOLE 500 mg tablet Commonly known as:  FLAGYL Dose:  500 mg Take 1 Tab by mouth three (3) times daily for 10 days. Quantity:  30 Tab Refills:  0 Follow-up Information Follow up With Details Comments Contact Info None   None ([phone]) Patient stated that they have no PCP Discharge Instructions HOSPITALIST DISCHARGE INSTRUCTIONS 
 
NAME: Sherine Smith :  1965 MRN:  [id number] Date/Time:  2017 11:30 AM 
 
ADMIT DATE: 6/15/2017 DISCHARGE DATE: 2017 Attending Physician: Phillip Vázquez MD 
 
DISCHARGE DIAGNOSIS: 
PNA community acquired Empyema L with pleural effusion S/p sepsis S/p acute resp failure Pleuritic chest pain S/p acute renal failure MEDICATIONS: 
See above · It is important that you take the medication exactly as they are prescribed. · Keep your medication in the bottles provided by the pharmacist and keep a list of the medication names, dosages, and times to be taken in your wallet. · Do not take other medications without consulting your doctor. Pain Management: per above medications What to do at [facility] Recommended diet:  Regular Diet Recommended activity: Activity as tolerated Follow Up: Follow-up Information None  
  
you need to follow up with a PCP in 7-10 days. Please choose one. If you have questions regarding the hospital related prescriptions or hospital related issues please call [facility] at . You can always direct your questions to your primary care doctor if you are unable to reach your hospital physician; your PCP works as an extension of your hospital doctor just like your hospital doctor is an extension of your PCP for your time at [facility]. If you experience any of the following symptoms then please call your primary care physician or return to the emergency room if you cannot get hold of your doctor: 
Fever, chills, nausea, vomiting, diarrhea, change in mentation, falling, bleeding, shortness of breath Additional Instructions: 
 
 
Bring these papers with you to your follow up appointments. The papers will help your doctors be sure to continue the care plan from the hospital. 
 
 
 
 
 
 
Information obtained by : 
I understand that if any problems occur once I am at home I am to contact my physician. I understand and acknowledge receipt of the instructions indicated above. Physician's or R.N.'s Signature                                                                  Date/Time Patient or Representative Signature                                                          Da 
 
Chart Review Routing History No Routing History on File

## 2017-06-16 LAB
ALBUMIN SERPL BCP-MCNC: 2.2 G/DL (ref 3.5–5)
ALBUMIN/GLOB SERPL: 0.4 {RATIO} (ref 1.1–2.2)
ALP SERPL-CCNC: 183 U/L (ref 45–117)
ALT SERPL-CCNC: 40 U/L (ref 12–78)
ANION GAP BLD CALC-SCNC: 7 MMOL/L (ref 5–15)
AST SERPL W P-5'-P-CCNC: 24 U/L (ref 15–37)
BASOPHILS # BLD AUTO: 0 K/UL
BASOPHILS # BLD: 0 %
BILIRUB SERPL-MCNC: 0.7 MG/DL (ref 0.2–1)
BUN SERPL-MCNC: 17 MG/DL (ref 6–20)
BUN/CREAT SERPL: 17 (ref 12–20)
CALCIUM SERPL-MCNC: 9.1 MG/DL (ref 8.5–10.1)
CHLORIDE SERPL-SCNC: 101 MMOL/L (ref 97–108)
CO2 SERPL-SCNC: 28 MMOL/L (ref 21–32)
CREAT SERPL-MCNC: 1.01 MG/DL (ref 0.7–1.3)
DIFFERENTIAL METHOD BLD: ABNORMAL
EOSINOPHIL # BLD: 0.6 K/UL
EOSINOPHIL NFR BLD: 2 %
ERYTHROCYTE [DISTWIDTH] IN BLOOD BY AUTOMATED COUNT: 13.7 % (ref 11.5–14.5)
GLOBULIN SER CALC-MCNC: 5.4 G/DL (ref 2–4)
GLUCOSE SERPL-MCNC: 81 MG/DL (ref 65–100)
HCT VFR BLD AUTO: 30.5 % (ref 36.6–50.3)
HGB BLD-MCNC: 10.4 G/DL (ref 12.1–17)
LYMPHOCYTES # BLD AUTO: 9 %
LYMPHOCYTES # BLD: 2.9 K/UL
MCH RBC QN AUTO: 30.5 PG (ref 26–34)
MCHC RBC AUTO-ENTMCNC: 34.1 G/DL (ref 30–36.5)
MCV RBC AUTO: 89.4 FL (ref 80–99)
MONOCYTES # BLD: 2.5 K/UL
MONOCYTES NFR BLD AUTO: 8 %
NEUTS BAND NFR BLD MANUAL: 3 %
NEUTS SEG # BLD: 25.7 K/UL
NEUTS SEG NFR BLD AUTO: 78 %
PLATELET # BLD AUTO: 355 K/UL (ref 150–400)
PLATELET COMMENTS,PCOM: ABNORMAL
POTASSIUM SERPL-SCNC: 4.1 MMOL/L (ref 3.5–5.1)
PROT SERPL-MCNC: 7.6 G/DL (ref 6.4–8.2)
RBC # BLD AUTO: 3.41 M/UL (ref 4.1–5.7)
RBC MORPH BLD: ABNORMAL
SODIUM SERPL-SCNC: 136 MMOL/L (ref 136–145)
WBC # BLD AUTO: 31.7 K/UL (ref 4.1–11.1)
WBC MORPH BLD: ABNORMAL

## 2017-06-16 PROCEDURE — 77010033678 HC OXYGEN DAILY

## 2017-06-16 PROCEDURE — 74011000258 HC RX REV CODE- 258: Performed by: EMERGENCY MEDICINE

## 2017-06-16 PROCEDURE — 74011250636 HC RX REV CODE- 250/636: Performed by: INTERNAL MEDICINE

## 2017-06-16 PROCEDURE — 74011250637 HC RX REV CODE- 250/637: Performed by: INTERNAL MEDICINE

## 2017-06-16 PROCEDURE — 85025 COMPLETE CBC W/AUTO DIFF WBC: CPT | Performed by: INTERNAL MEDICINE

## 2017-06-16 PROCEDURE — 74011250636 HC RX REV CODE- 250/636: Performed by: EMERGENCY MEDICINE

## 2017-06-16 PROCEDURE — 65660000000 HC RM CCU STEPDOWN

## 2017-06-16 PROCEDURE — 36415 COLL VENOUS BLD VENIPUNCTURE: CPT | Performed by: INTERNAL MEDICINE

## 2017-06-16 PROCEDURE — 80053 COMPREHEN METABOLIC PANEL: CPT | Performed by: INTERNAL MEDICINE

## 2017-06-16 RX ORDER — KETOROLAC TROMETHAMINE 30 MG/ML
15 INJECTION, SOLUTION INTRAMUSCULAR; INTRAVENOUS
Status: DISCONTINUED | OUTPATIENT
Start: 2017-06-16 | End: 2017-06-21

## 2017-06-16 RX ORDER — KETOROLAC TROMETHAMINE 30 MG/ML
30 INJECTION, SOLUTION INTRAMUSCULAR; INTRAVENOUS
Status: COMPLETED | OUTPATIENT
Start: 2017-06-16 | End: 2017-06-16

## 2017-06-16 RX ADMIN — KETOROLAC TROMETHAMINE 30 MG: 30 INJECTION, SOLUTION INTRAMUSCULAR at 18:57

## 2017-06-16 RX ADMIN — CEFTRIAXONE SODIUM 2 G: 2 INJECTION, POWDER, FOR SOLUTION INTRAMUSCULAR; INTRAVENOUS at 04:27

## 2017-06-16 RX ADMIN — HYDROCODONE BITARTRATE AND ACETAMINOPHEN 1 TABLET: 5; 325 TABLET ORAL at 18:57

## 2017-06-16 RX ADMIN — GUAIFENESIN 600 MG: 600 TABLET, EXTENDED RELEASE ORAL at 20:50

## 2017-06-16 RX ADMIN — Medication 10 ML: at 20:50

## 2017-06-16 RX ADMIN — GUAIFENESIN 600 MG: 600 TABLET, EXTENDED RELEASE ORAL at 10:27

## 2017-06-16 RX ADMIN — HYDROCODONE BITARTRATE AND ACETAMINOPHEN 1 TABLET: 5; 325 TABLET ORAL at 10:16

## 2017-06-16 RX ADMIN — AZITHROMYCIN MONOHYDRATE 500 MG: 500 INJECTION, POWDER, LYOPHILIZED, FOR SOLUTION INTRAVENOUS at 04:26

## 2017-06-16 RX ADMIN — ENOXAPARIN SODIUM 40 MG: 40 INJECTION SUBCUTANEOUS at 10:27

## 2017-06-16 RX ADMIN — SODIUM CHLORIDE 125 ML/HR: 900 INJECTION, SOLUTION INTRAVENOUS at 10:16

## 2017-06-16 RX ADMIN — HYDROCODONE BITARTRATE AND ACETAMINOPHEN 1 TABLET: 5; 325 TABLET ORAL at 04:34

## 2017-06-16 NOTE — CDMP QUERY
1.   Thank you for documenting sepsis POA for this patient. In light of corresponding NARINDER, and acute respiratory failure can this be further specified:    =>Severe sepsis POA as evidenced by corresponding acute organ dysfunction of NARINDER and acute hypoxic respiratory faliure  =>Other Explanation of clinical findings  =>Unable to Determine (no explanation of clinical findings)    The medical record reflects the following clinical findings, treatment, and risk factors:    48 y/o male admitted for sepsis 2/2 CAP. He also has corresponding NARINDER, and acute hypoxic respiratory failure POA, qualifying as severe sepsis. Please clarify and document your clinical opinion in the progress notes and discharge summary including the definitive and/or presumptive diagnosis, (suspected or probable), related to the above clinical findings. Please include clinical findings supporting your diagnosis. Thanks for your time.     Eve Max RN, BSN  [phone]

## 2017-06-16 NOTE — PROGRESS NOTES
Hospitalist Progress Note    NAME: Roma Ochoa   :  1965   MRN:  [id number]       Assessment / Plan:  Sepsis POA  Acute respiratory failure POA with hypoxia  Likely underlying Chronic resp failure /COPD/due to smoking  Pleuritic chest pain POA- persistent  Due to Pneumonia (LLL) with L Pleural effusion  Lact normal  UA neg    Cont IV Rocephin and Zithromax   Check Sputum cultures when available  F/u blood cultures - remains negative  Cont IVF  Cont O2 - taper down as able to off if possible  PRN nebs  PRN Norco, Try toradol prn now that Cr improved     Acute Renal Failure POA- resolved now, Cr 1.0  Hyponatremia POA- resolved  Seems related to dehydration  IVF and monitor lytes     D-dimer, elevated POA  CTA chest neg for PE, confirms LLL ASD + mild Pleural effusion       Code Status: Full  Surrogate Decision Maker: Wife     DVT Prophylaxis: Lovenox  GI Prophylaxis: not indicated     Baseline: functional      Body mass index is 21.62 kg/(m^2). Code status: Full  Recommended Disposition: Home w/Family     Subjective:     Chief Complaint / Reason for Physician Visit F/u Resp failure, PNA, Sepsis  \"I am fine, still hurts to take a deep breath\". Discussed with RN events overnight. Review of Systems:  Symptom Y/N Comments  Symptom Y/N Comments   Fever/Chills n Afebrile in past 24 hrs  Chest Pain y L sided on deep breath   Poor Appetite n   Edema     Cough y   Abdominal Pain     Sputum y   Joint Pain     SOB/BERG y improved  Pruritis/Rash     Nausea/vomit    Tolerating PT/OT y    Diarrhea    Tolerating Diet y    Constipation    Other       Could NOT obtain due to:      Objective:     VITALS:   Last 24hrs VS reviewed since prior progress note.  Most recent are:  Patient Vitals for the past 24 hrs:   Temp Pulse Resp BP SpO2   17 1547 99.9 °F (37.7 °C) 90 17 123/66 97 %   17 1125 98.9 °F (37.2 °C) 80 17 110/63 98 %   17 0740 98.4 °F (36.9 °C) 87 18 104/65 99 %   17 0014 97.9 °F (36.6 °C) 76 18 117/72 100 %   06/15/17 1945 98.6 °F (37 °C) 77 18 100/59 95 %   06/15/17 1821 98.2 °F (36.8 °C) 78 17 106/72 98 %     No intake or output data in the 24 hours ending 06/16/17 1636     PHYSICAL EXAM:  General: WD, WN. Alert, cooperative, no acute distress    EENT:  EOMI. Anicteric sclerae. MMM  Resp:  Coarse BS on the L lung filed, decreased BS at the L base +  No accessory muscle use  CV:  Regular  rhythm,  No edema  GI:  Soft, Non distended, Non tender.  +Bowel sounds  Neurologic:  Alert and oriented X 3, normal speech,   Psych:   Good insight. Not anxious nor agitated  Skin:  No rashes. No jaundice    Reviewed most current lab test results and cultures  YES  Reviewed most current radiology test results   YES  Review and summation of old records today    NO  Reviewed patient's current orders and MAR    YES  PMH/SH reviewed - no change compared to H&P  ________________________________________________________________________  Care Plan discussed with:    Comments   Patient x    Family      RN x    Care Manager x MARKELL   Consultant                        Multidiciplinary team rounds were held today with , nursing, pharmacist and clinical coordinator. Patient's plan of care was discussed; medications were reviewed and discharge planning was addressed. ________________________________________________________________________  Total NON critical care TIME:  36   Minutes    Total CRITICAL CARE TIME Spent:   Minutes non procedure based      Comments   >50% of visit spent in counseling and coordination of care     ________________________________________________________________________  Anirudh Wilson MD     Procedures: see electronic medical records for all procedures/Xrays and details which were not copied into this note but were reviewed prior to creation of Plan. LABS:  I reviewed today's most current labs and imaging studies.   Pertinent labs include:  Recent Labs      06/16/17 0441  06/15/17   0521   WBC  31.7*  19.1*   HGB  10.4*  11.0*   HCT  30.5*  30.7*   PLT  355  309     Recent Labs      06/16/17   0441  06/15/17   0552   NA  136  130*   K  4.1  2.8*   CL  101  92*   CO2  28  29   GLU  81  114*   BUN  17  24*   CREA  1.01  1.42*   CA  9.1  8.2*   ALB  2.2*  2.3*   TBILI  0.7  1.0   SGOT  24  65*   ALT  40  55       Signed: Sun Nevarez MD

## 2017-06-16 NOTE — PROGRESS NOTES
Visited by Rosina Meraz Partner June 16, 2017.     Georgia Arvizu, Lead [facility] Paging Service  [phone])

## 2017-06-17 ENCOUNTER — APPOINTMENT (OUTPATIENT)
Dept: GENERAL RADIOLOGY | Age: 52
DRG: 871 | End: 2017-06-17
Attending: INTERNAL MEDICINE
Payer: SUBSIDIZED

## 2017-06-17 PROBLEM — J90 PLEURAL EFFUSION, LEFT: Status: ACTIVE | Noted: 2017-06-17

## 2017-06-17 LAB
ANION GAP BLD CALC-SCNC: 9 MMOL/L (ref 5–15)
BUN SERPL-MCNC: 13 MG/DL (ref 6–20)
BUN/CREAT SERPL: 15 (ref 12–20)
CALCIUM SERPL-MCNC: 8.5 MG/DL (ref 8.5–10.1)
CHLORIDE SERPL-SCNC: 103 MMOL/L (ref 97–108)
CO2 SERPL-SCNC: 25 MMOL/L (ref 21–32)
CREAT SERPL-MCNC: 0.87 MG/DL (ref 0.7–1.3)
ERYTHROCYTE [DISTWIDTH] IN BLOOD BY AUTOMATED COUNT: 14 % (ref 11.5–14.5)
GLUCOSE SERPL-MCNC: 79 MG/DL (ref 65–100)
HCT VFR BLD AUTO: 29.2 % (ref 36.6–50.3)
HGB BLD-MCNC: 9.9 G/DL (ref 12.1–17)
MCH RBC QN AUTO: 30 PG (ref 26–34)
MCHC RBC AUTO-ENTMCNC: 33.9 G/DL (ref 30–36.5)
MCV RBC AUTO: 88.5 FL (ref 80–99)
PLATELET # BLD AUTO: 370 K/UL (ref 150–400)
POTASSIUM SERPL-SCNC: 3.6 MMOL/L (ref 3.5–5.1)
RBC # BLD AUTO: 3.3 M/UL (ref 4.1–5.7)
SODIUM SERPL-SCNC: 137 MMOL/L (ref 136–145)
WBC # BLD AUTO: 31.9 K/UL (ref 4.1–11.1)

## 2017-06-17 PROCEDURE — 74011250637 HC RX REV CODE- 250/637: Performed by: INTERNAL MEDICINE

## 2017-06-17 PROCEDURE — 74011250636 HC RX REV CODE- 250/636: Performed by: INTERNAL MEDICINE

## 2017-06-17 PROCEDURE — 77030029684 HC NEB SM VOL KT MONA -A

## 2017-06-17 PROCEDURE — 74011250636 HC RX REV CODE- 250/636: Performed by: EMERGENCY MEDICINE

## 2017-06-17 PROCEDURE — 36415 COLL VENOUS BLD VENIPUNCTURE: CPT | Performed by: INTERNAL MEDICINE

## 2017-06-17 PROCEDURE — 65660000000 HC RM CCU STEPDOWN

## 2017-06-17 PROCEDURE — 94640 AIRWAY INHALATION TREATMENT: CPT

## 2017-06-17 PROCEDURE — 71020 XR CHEST PA LAT: CPT

## 2017-06-17 PROCEDURE — 80048 BASIC METABOLIC PNL TOTAL CA: CPT | Performed by: INTERNAL MEDICINE

## 2017-06-17 PROCEDURE — 85027 COMPLETE CBC AUTOMATED: CPT | Performed by: INTERNAL MEDICINE

## 2017-06-17 PROCEDURE — 74011000250 HC RX REV CODE- 250: Performed by: INTERNAL MEDICINE

## 2017-06-17 PROCEDURE — 74011000258 HC RX REV CODE- 258: Performed by: EMERGENCY MEDICINE

## 2017-06-17 PROCEDURE — 94664 DEMO&/EVAL PT USE INHALER: CPT

## 2017-06-17 RX ORDER — LEVOFLOXACIN 5 MG/ML
750 INJECTION, SOLUTION INTRAVENOUS EVERY 24 HOURS
Status: DISCONTINUED | OUTPATIENT
Start: 2017-06-18 | End: 2017-06-22 | Stop reason: HOSPADM

## 2017-06-17 RX ADMIN — IPRATROPIUM BROMIDE AND ALBUTEROL SULFATE 3 ML: .5; 3 SOLUTION RESPIRATORY (INHALATION) at 21:44

## 2017-06-17 RX ADMIN — HYDROCODONE BITARTRATE AND ACETAMINOPHEN 1 TABLET: 5; 325 TABLET ORAL at 09:54

## 2017-06-17 RX ADMIN — AZITHROMYCIN MONOHYDRATE 500 MG: 500 INJECTION, POWDER, LYOPHILIZED, FOR SOLUTION INTRAVENOUS at 04:24

## 2017-06-17 RX ADMIN — HYDROCODONE BITARTRATE AND ACETAMINOPHEN 1 TABLET: 5; 325 TABLET ORAL at 21:28

## 2017-06-17 RX ADMIN — HYDROCODONE BITARTRATE AND ACETAMINOPHEN 1 TABLET: 5; 325 TABLET ORAL at 04:23

## 2017-06-17 RX ADMIN — GUAIFENESIN 600 MG: 600 TABLET, EXTENDED RELEASE ORAL at 08:35

## 2017-06-17 RX ADMIN — KETOROLAC TROMETHAMINE 15 MG: 30 INJECTION, SOLUTION INTRAMUSCULAR at 13:51

## 2017-06-17 RX ADMIN — CEFTRIAXONE SODIUM 2 G: 2 INJECTION, POWDER, FOR SOLUTION INTRAMUSCULAR; INTRAVENOUS at 04:24

## 2017-06-17 RX ADMIN — GUAIFENESIN 600 MG: 600 TABLET, EXTENDED RELEASE ORAL at 21:28

## 2017-06-17 RX ADMIN — ENOXAPARIN SODIUM 40 MG: 40 INJECTION SUBCUTANEOUS at 05:43

## 2017-06-17 RX ADMIN — Medication 10 ML: at 21:28

## 2017-06-17 RX ADMIN — Medication 10 ML: at 15:37

## 2017-06-17 NOTE — PROGRESS NOTES
Hospitalist Progress Note    NAME: Zbigniew Soriano   :  1965   MRN:  [id number]       Assessment / Plan:  Sepsis POA  Leukocytosis POA- worsening  Acute respiratory failure POA with hypoxia  Likely underlying Chronic resp failure /COPD/due to smoking  Pleuritic chest pain POA- persistent  Due to Pneumonia (LLL) with L Pleural effusion- worsening on repeat CXR today  Lact normal  UA neg    S/p IV Rocephin , changed to IV levaquin today  Cont Zithromax for atypical coverage  Check Sputum cultures when available  F/u blood cultures - remains negative  DC IVF today  Cont O2 - taper down as able to off if possible  May need IR to tap the L pleural space if not improved soon  PRN nebs  PRN Norco, cont toradol prn for pleuritic chest pain - seems to be working as per pt     Acute Renal Failure POA- resolved now, Cr 1.0  Hyponatremia POA- resolved  Seems related to dehydration  IVF and monitor lytes     D-dimer, elevated POA  CTA chest neg for PE, confirms LLL ASD + mild Pleural effusion       Code Status: Full  Surrogate Decision Maker: Wife     DVT Prophylaxis: Lovenox  GI Prophylaxis: not indicated     Baseline: functional      Body mass index is 21.62 kg/(m^2). Code status: Full  Recommended Disposition: Home w/Family     Subjective:     Chief Complaint / Reason for Physician Visit F/u Resp failure, PNA, Sepsis  \"I feel better today\". Discussed with RN events overnight. Review of Systems:  Symptom Y/N Comments  Symptom Y/N Comments   Fever/Chills n Afebrile in past 24 hrs  Chest Pain y L sided on deep breath   Poor Appetite n   Edema     Cough y   Abdominal Pain     Sputum y   Joint Pain     SOB/BERG y improved  Pruritis/Rash     Nausea/vomit    Tolerating PT/OT y    Diarrhea    Tolerating Diet y    Constipation    Other       Could NOT obtain due to:      Objective:     VITALS:   Last 24hrs VS reviewed since prior progress note.  Most recent are:  Patient Vitals for the past 24 hrs:   Temp Pulse Resp BP SpO2   06/17/17 1041 98.5 °F (36.9 °C) 87 16 125/75 95 %   06/17/17 0829 98.5 °F (36.9 °C) 88 16 127/76 96 %   06/17/17 0438 98.9 °F (37.2 °C) 86 15 110/65 96 %   06/17/17 0126 98.6 °F (37 °C) 73 16 112/68 97 %   06/16/17 2009 98.4 °F (36.9 °C) 86 16 121/74 98 %   06/16/17 1547 99.9 °F (37.7 °C) 90 17 123/66 97 %       Intake/Output Summary (Last 24 hours) at 06/17/17 1458  Last data filed at 06/17/17 0957   Gross per 24 hour   Intake              200 ml   Output                0 ml   Net              200 ml        PHYSICAL EXAM:  General: WD, WN. Alert, cooperative, no acute distress    EENT:  EOMI. Anicteric sclerae. MMM  Resp:  Coarse BS on the L lung filed, decreased BS at the L base +  No accessory muscle use  CV:  Regular  rhythm,  No edema  GI:  Soft, Non distended, Non tender.  +Bowel sounds  Neurologic:  Alert and oriented X 3, normal speech,   Psych:   Good insight. Not anxious nor agitated  Skin:  No rashes. No jaundice    Reviewed most current lab test results and cultures  YES  Reviewed most current radiology test results   YES  Review and summation of old records today    NO  Reviewed patient's current orders and MAR    YES  PMH/ reviewed - no change compared to H&P  ________________________________________________________________________  Care Plan discussed with:    Comments   Patient x    Family      RN x    Care Manager     Consultant                        Multidiciplinary team rounds were held today with , nursing, pharmacist and clinical coordinator. Patient's plan of care was discussed; medications were reviewed and discharge planning was addressed.      ________________________________________________________________________  Total NON critical care TIME:  26   Minutes    Total CRITICAL CARE TIME Spent:   Minutes non procedure based      Comments   >50% of visit spent in counseling and coordination of care ________________________________________________________________________  Marvin Rivers MD     Procedures: see electronic medical records for all procedures/Xrays and details which were not copied into this note but were reviewed prior to creation of Plan. LABS:  I reviewed today's most current labs and imaging studies.   Pertinent labs include:  Recent Labs      06/17/17   0431  06/16/17   0441  06/15/17   0521   WBC  31.9*  31.7*  19.1*   HGB  9.9*  10.4*  11.0*   HCT  29.2*  30.5*  30.7*   PLT  370  355  309     Recent Labs      06/17/17 0431  06/16/17   0441  06/15/17   0552   NA  137  136  130*   K  3.6  4.1  2.8*   CL  103  101  92*   CO2  25  28  29   GLU  79  81  114*   BUN  13  17  24*   CREA  0.87  1.01  1.42*   CA  8.5  9.1  8.2*   ALB   --   2.2*  2.3*   TBILI   --   0.7  1.0   SGOT   --   24  65*   ALT   --   40  55       Signed: Marvin Rivers MD

## 2017-06-17 NOTE — PROGRESS NOTES
Bedside shift change report given to Tico Pablo (oncoming nurse) by Tico Pablo (offgoing nurse). Report included the following information SBAR, Kardex, Intake/Output and MAR.

## 2017-06-18 LAB
ANION GAP BLD CALC-SCNC: 9 MMOL/L (ref 5–15)
BUN SERPL-MCNC: 8 MG/DL (ref 6–20)
BUN/CREAT SERPL: 12 (ref 12–20)
CALCIUM SERPL-MCNC: 8.6 MG/DL (ref 8.5–10.1)
CHLORIDE SERPL-SCNC: 101 MMOL/L (ref 97–108)
CO2 SERPL-SCNC: 25 MMOL/L (ref 21–32)
CREAT SERPL-MCNC: 0.67 MG/DL (ref 0.7–1.3)
ERYTHROCYTE [DISTWIDTH] IN BLOOD BY AUTOMATED COUNT: 13.9 % (ref 11.5–14.5)
GLUCOSE SERPL-MCNC: 90 MG/DL (ref 65–100)
HCT VFR BLD AUTO: 26.9 % (ref 36.6–50.3)
HGB BLD-MCNC: 9.4 G/DL (ref 12.1–17)
MCH RBC QN AUTO: 30.2 PG (ref 26–34)
MCHC RBC AUTO-ENTMCNC: 34.9 G/DL (ref 30–36.5)
MCV RBC AUTO: 86.5 FL (ref 80–99)
PLATELET # BLD AUTO: 446 K/UL (ref 150–400)
POTASSIUM SERPL-SCNC: 3.7 MMOL/L (ref 3.5–5.1)
RBC # BLD AUTO: 3.11 M/UL (ref 4.1–5.7)
SODIUM SERPL-SCNC: 135 MMOL/L (ref 136–145)
WBC # BLD AUTO: 36.1 K/UL (ref 4.1–11.1)

## 2017-06-18 PROCEDURE — 74011250636 HC RX REV CODE- 250/636: Performed by: INTERNAL MEDICINE

## 2017-06-18 PROCEDURE — 87205 SMEAR GRAM STAIN: CPT | Performed by: INTERNAL MEDICINE

## 2017-06-18 PROCEDURE — 36415 COLL VENOUS BLD VENIPUNCTURE: CPT | Performed by: INTERNAL MEDICINE

## 2017-06-18 PROCEDURE — 65660000000 HC RM CCU STEPDOWN

## 2017-06-18 PROCEDURE — 82945 GLUCOSE OTHER FLUID: CPT | Performed by: INTERNAL MEDICINE

## 2017-06-18 PROCEDURE — 74011250637 HC RX REV CODE- 250/637: Performed by: INTERNAL MEDICINE

## 2017-06-18 PROCEDURE — 77010033678 HC OXYGEN DAILY

## 2017-06-18 PROCEDURE — 80048 BASIC METABOLIC PNL TOTAL CA: CPT | Performed by: INTERNAL MEDICINE

## 2017-06-18 PROCEDURE — 85027 COMPLETE CBC AUTOMATED: CPT | Performed by: INTERNAL MEDICINE

## 2017-06-18 PROCEDURE — 84157 ASSAY OF PROTEIN OTHER: CPT | Performed by: INTERNAL MEDICINE

## 2017-06-18 PROCEDURE — 83615 LACTATE (LD) (LDH) ENZYME: CPT | Performed by: INTERNAL MEDICINE

## 2017-06-18 PROCEDURE — 89050 BODY FLUID CELL COUNT: CPT | Performed by: INTERNAL MEDICINE

## 2017-06-18 RX ADMIN — Medication 10 ML: at 22:02

## 2017-06-18 RX ADMIN — HYDROCODONE BITARTRATE AND ACETAMINOPHEN 1 TABLET: 5; 325 TABLET ORAL at 14:14

## 2017-06-18 RX ADMIN — Medication 10 ML: at 14:14

## 2017-06-18 RX ADMIN — LEVOFLOXACIN 750 MG: 5 INJECTION, SOLUTION INTRAVENOUS at 10:12

## 2017-06-18 RX ADMIN — GUAIFENESIN 600 MG: 600 TABLET, EXTENDED RELEASE ORAL at 20:00

## 2017-06-18 RX ADMIN — GUAIFENESIN 600 MG: 600 TABLET, EXTENDED RELEASE ORAL at 10:12

## 2017-06-18 RX ADMIN — Medication 10 ML: at 05:34

## 2017-06-18 RX ADMIN — ENOXAPARIN SODIUM 40 MG: 40 INJECTION SUBCUTANEOUS at 07:53

## 2017-06-18 RX ADMIN — HYDROCODONE BITARTRATE AND ACETAMINOPHEN 1 TABLET: 5; 325 TABLET ORAL at 19:59

## 2017-06-18 NOTE — PROGRESS NOTES
Bedside and Verbal shift change report given to Ana Zamora RN (oncoming nurse) by Trevor Barajas RN (offgoing nurse). Report included the following information SBAR, Kardex, Intake/Output and Cardiac Rhythm NSR.

## 2017-06-18 NOTE — PROGRESS NOTES
Bedside and Verbal shift change report given to RONALD Rowe (oncoming nurse) by Obdulia Dallas RN (offgoing nurse). Report included the following information SBAR, Kardex, Procedure Summary, Intake/Output, MAR and Recent Results.

## 2017-06-18 NOTE — PROGRESS NOTES
Hospitalist Progress Note    NAME: Selvin Hart   :  1965   MRN:  [id number]       Assessment / Plan:  Sepsis POA  Leukocytosis POA- worsening, up to 36k today  Acute respiratory failure POA with hypoxia  Likely underlying Chronic resp failure /COPD/due to smoking  Pleuritic chest pain POA- persistent but improved today as per pt  Due to Pneumonia (LLL) with L Pleural effusion- worsening on repeat CXR today  Lact normal  UA neg    S/p IV Rocephin & Azithro , changed to IV levaquin yesterday- cont for now  Check Sputum cultures when available  F/u blood cultures - remains negative  Off IVF now  Cont O2 - taper down as able to off if possible  Consult IR to tap the L pleural space to rule out empyema with worsening Leukocytosis  PRN nebs  PRN Norco, cont toradol prn for pleuritic chest pain - seems to be working as per pt     Acute Renal Failure POA- resolved now, Cr 1.0  Hyponatremia POA- resolved  Seems related to dehydration  IVF and monitor lytes     D-dimer, elevated POA  CTA chest neg for PE, confirms LLL ASD + mild Pleural effusion       Code Status: Full  Surrogate Decision Maker: Wife     DVT Prophylaxis: Lovenox  GI Prophylaxis: not indicated     Baseline: functional      Body mass index is 21.62 kg/(m^2). Code status: Full  Recommended Disposition: Home w/Family     Subjective:     Chief Complaint / Reason for Physician Visit F/u Resp failure, PNA, Sepsis  \"I feel better today\". Discussed with RN events overnight. Review of Systems:  Symptom Y/N Comments  Symptom Y/N Comments   Fever/Chills n Afebrile in past 48 hrs  Chest Pain y L sided on deep breath   Poor Appetite n   Edema     Cough y   Abdominal Pain     Sputum y   Joint Pain     SOB/BERG y improved  Pruritis/Rash     Nausea/vomit    Tolerating PT/OT y    Diarrhea    Tolerating Diet y    Constipation    Other       Could NOT obtain due to:      Objective:     VITALS:   Last 24hrs VS reviewed since prior progress note.  Most recent are:  Patient Vitals for the past 24 hrs:   Temp Pulse Resp BP SpO2   06/18/17 0748 98.2 °F (36.8 °C) 78 16 131/70 95 %   06/18/17 0400 98.7 °F (37.1 °C) 89 16 139/74 96 %   06/18/17 0000 98.8 °F (37.1 °C) 86 16 143/68 96 %   06/17/17 2143 - - - - 93 %   06/17/17 2014 99.8 °F (37.7 °C) 92 16 145/80 91 %   06/17/17 1530 98.6 °F (37 °C) 88 16 118/70 96 %       Intake/Output Summary (Last 24 hours) at 06/18/17 1349  Last data filed at 06/18/17 1015   Gross per 24 hour   Intake              390 ml   Output                0 ml   Net              390 ml        PHYSICAL EXAM:  General: WD, WN. Alert, cooperative, no acute distress    EENT:  EOMI. Anicteric sclerae. MMM  Resp:  Coarse BS on the L lung filed, decreased BS at the L base +  No accessory muscle use  CV:  Regular  rhythm,  No edema  GI:  Soft, Non distended, Non tender.  +Bowel sounds  Neurologic:  Alert and oriented X 3, normal speech,   Psych:   Good insight. Not anxious nor agitated  Skin:  No rashes. No jaundice    Reviewed most current lab test results and cultures  YES  Reviewed most current radiology test results   YES  Review and summation of old records today    NO  Reviewed patient's current orders and MAR    YES  PMH/ reviewed - no change compared to H&P  ________________________________________________________________________  Care Plan discussed with:    Comments   Patient x    Family      RN x    Care Manager     Consultant                        Multidiciplinary team rounds were held today with , nursing, pharmacist and clinical coordinator. Patient's plan of care was discussed; medications were reviewed and discharge planning was addressed.      ________________________________________________________________________  Total NON critical care TIME:  26   Minutes    Total CRITICAL CARE TIME Spent:   Minutes non procedure based      Comments   >50% of visit spent in counseling and coordination of care ________________________________________________________________________  Amado Ga MD     Procedures: see electronic medical records for all procedures/Xrays and details which were not copied into this note but were reviewed prior to creation of Plan. LABS:  I reviewed today's most current labs and imaging studies.   Pertinent labs include:  Recent Labs      06/18/17 0521 06/17/17   0431  06/16/17   0441   WBC  36.1*  31.9*  31.7*   HGB  9.4*  9.9*  10.4*   HCT  26.9*  29.2*  30.5*   PLT  446*  370  355     Recent Labs      06/18/17 0521 06/17/17 0431  06/16/17   0441   NA  135*  137  136   K  3.7  3.6  4.1   CL  101  103  101   CO2  25  25  28   GLU  90  79  81   BUN  8  13  17   CREA  0.67*  0.87  1.01   CA  8.6  8.5  9.1   ALB   --    --   2.2*   TBILI   --    --   0.7   SGOT   --    --   24   ALT   --    --   40       Signed: Amado Ga MD

## 2017-06-19 ENCOUNTER — APPOINTMENT (OUTPATIENT)
Dept: ULTRASOUND IMAGING | Age: 52
DRG: 871 | End: 2017-06-19
Attending: INTERNAL MEDICINE
Payer: SUBSIDIZED

## 2017-06-19 ENCOUNTER — APPOINTMENT (OUTPATIENT)
Dept: GENERAL RADIOLOGY | Age: 52
DRG: 871 | End: 2017-06-19
Attending: RADIOLOGY
Payer: SUBSIDIZED

## 2017-06-19 LAB
ANION GAP BLD CALC-SCNC: 7 MMOL/L (ref 5–15)
APPEARANCE FLD: ABNORMAL
BUN SERPL-MCNC: 9 MG/DL (ref 6–20)
BUN/CREAT SERPL: 15 (ref 12–20)
CALCIUM SERPL-MCNC: 8.6 MG/DL (ref 8.5–10.1)
CHLORIDE SERPL-SCNC: 101 MMOL/L (ref 97–108)
CO2 SERPL-SCNC: 26 MMOL/L (ref 21–32)
COLOR FLD: ABNORMAL
CREAT SERPL-MCNC: 0.62 MG/DL (ref 0.7–1.3)
ERYTHROCYTE [DISTWIDTH] IN BLOOD BY AUTOMATED COUNT: 13.9 % (ref 11.5–14.5)
GLUCOSE FLD-MCNC: <1 MG/DL
GLUCOSE SERPL-MCNC: 100 MG/DL (ref 65–100)
HCT VFR BLD AUTO: 26.5 % (ref 36.6–50.3)
HGB BLD-MCNC: 9.3 G/DL (ref 12.1–17)
LDH FLD L TO P-CCNC: >1200 U/L
MCH RBC QN AUTO: 30.7 PG (ref 26–34)
MCHC RBC AUTO-ENTMCNC: 35.1 G/DL (ref 30–36.5)
MCV RBC AUTO: 87.5 FL (ref 80–99)
NUC CELL # FLD: ABNORMAL /CU MM (ref 0–5)
OTHER CELL,FOTHC: 0 %
PLATELET # BLD AUTO: 499 K/UL (ref 150–400)
POTASSIUM SERPL-SCNC: 3.6 MMOL/L (ref 3.5–5.1)
PROT FLD-MCNC: 4.3 G/DL
RBC # BLD AUTO: 3.03 M/UL (ref 4.1–5.7)
RBC # FLD: >100 /CU MM
SODIUM SERPL-SCNC: 134 MMOL/L (ref 136–145)
SPECIMEN SOURCE FLD: ABNORMAL
SPECIMEN SOURCE FLD: NORMAL
TOTAL CELLS COUNTED SPEC: 0
WBC # BLD AUTO: 26.6 K/UL (ref 4.1–11.1)

## 2017-06-19 PROCEDURE — 85027 COMPLETE CBC AUTOMATED: CPT | Performed by: INTERNAL MEDICINE

## 2017-06-19 PROCEDURE — 65660000000 HC RM CCU STEPDOWN

## 2017-06-19 PROCEDURE — 74011250636 HC RX REV CODE- 250/636: Performed by: INTERNAL MEDICINE

## 2017-06-19 PROCEDURE — 88305 TISSUE EXAM BY PATHOLOGIST: CPT | Performed by: INTERNAL MEDICINE

## 2017-06-19 PROCEDURE — 80048 BASIC METABOLIC PNL TOTAL CA: CPT | Performed by: INTERNAL MEDICINE

## 2017-06-19 PROCEDURE — 74011250637 HC RX REV CODE- 250/637: Performed by: INTERNAL MEDICINE

## 2017-06-19 PROCEDURE — 88112 CYTOPATH CELL ENHANCE TECH: CPT | Performed by: INTERNAL MEDICINE

## 2017-06-19 PROCEDURE — 0W9B3ZZ DRAINAGE OF LEFT PLEURAL CAVITY, PERCUTANEOUS APPROACH: ICD-10-PCS | Performed by: RADIOLOGY

## 2017-06-19 PROCEDURE — 36415 COLL VENOUS BLD VENIPUNCTURE: CPT | Performed by: INTERNAL MEDICINE

## 2017-06-19 PROCEDURE — 77030028236 US THORACENTESIS CATH W IMAGE

## 2017-06-19 PROCEDURE — 71010 XR CHEST PORT: CPT

## 2017-06-19 PROCEDURE — 77010033678 HC OXYGEN DAILY

## 2017-06-19 RX ADMIN — Medication 10 ML: at 06:09

## 2017-06-19 RX ADMIN — GUAIFENESIN 600 MG: 600 TABLET, EXTENDED RELEASE ORAL at 21:41

## 2017-06-19 RX ADMIN — HYDROCODONE BITARTRATE AND ACETAMINOPHEN 1 TABLET: 5; 325 TABLET ORAL at 15:09

## 2017-06-19 RX ADMIN — ENOXAPARIN SODIUM 40 MG: 40 INJECTION SUBCUTANEOUS at 06:17

## 2017-06-19 RX ADMIN — Medication 10 ML: at 21:41

## 2017-06-19 RX ADMIN — LEVOFLOXACIN 750 MG: 5 INJECTION, SOLUTION INTRAVENOUS at 08:41

## 2017-06-19 RX ADMIN — HYDROCODONE BITARTRATE AND ACETAMINOPHEN 1 TABLET: 5; 325 TABLET ORAL at 00:49

## 2017-06-19 RX ADMIN — HYDROCODONE BITARTRATE AND ACETAMINOPHEN 1 TABLET: 5; 325 TABLET ORAL at 21:41

## 2017-06-19 RX ADMIN — Medication 10 ML: at 10:48

## 2017-06-19 NOTE — ROUTINE PROCESS
End of Shift Nursing Note    Bedside shift change report given to Jerson Loza RN (oncoming nurse) by Joseph Hanks RN (offgoing nurse). Report included the following information SBAR, Kardex, Intake/Output, MAR, Recent Results and Med Rec Status. Significant changes during shift:    none   Non-emergent issues for physician to address:   none     Number times ambulated in hallway past shift: 0      Number of times OOB to chair past shift: 0       Vital Signs:    Temp: 98.3 °F (36.8 °C)     Pulse (Heart Rate): 79     BP: 127/72     Resp Rate: 18     O2 Sat (%): 98 %    Lines & Drains:     Urinary Catheter? No   Placement Date:    Medical Necessity:   Central Line? No   Placement Date:    Medical Necessity:   PICC Line? No   Placement Date:    Medical Necessity:     NG tube [] in [] removed [x] not applicable   Drains [] in [] removed [x] not applicable     Skin Integrity:      Wounds: no   Dressings Present: no    Wound Concerns: no      GI:    Current diet:  DIET REGULAR    Nausea: NO  Vomiting: NO  Bowel Sounds: YES  Flatus: YES  Last Bowel Movement: several days ago   Appearance:     Respiratory:  Supplemental O2: Yes      Device: nasal cannula   via 2 Liters/min     Incentive Spirometer: YES    Coughing and Deep Breathing: YES  Oral Care: YES  Understanding (patient/family education): YES   Getting out of bed: YES  Head of bed elevation: NO    Patient Safety:    Falls Score: 0  Mobility Score:0  Bed Alarm On? No  Sitter? No      Opportunity for questions and clarification was given to oncoming nurse. Patient bed is in LOWEST position, side rails are up x 2, door & observation blinds open as needed, call bell within reach and patient not in distress.     Noah Mullen RN

## 2017-06-19 NOTE — PROGRESS NOTES
Awake, alert and oriented,  Pt states he feels better after procedure. Band-aid intact to left back ,  No bleeding noted.

## 2017-06-19 NOTE — ROUTINE PROCESS
Name of procedure:  US guided left thoracentesis with a total of 30 ml of thick yellow pleural fluid removed and specimens taken to lab. Complications, if any, r/t procedure:None      Sedation medications given:None given      VS : Stable       [facility] Yes post orders placed      :TRANSFER - OUT REPORT:    Verbal report given to Uvaldo Ortiz RN on Levora Conine  being transferred to room 2113 for routine progression of care       Report consisted of patients Situation, Background, Assessment and   Recommendations(SBAR). Information from the following report(s) Procedure Summary and Intake/Output was reviewed with the receiving nurse. Opportunity for questions and clarification was provided.       Patient transported with:   O2 @ 2 liters  Registered Nurse

## 2017-06-19 NOTE — ROUTINE PROCESS
End of Shift Nursing Note    Bedside shift change report given to Megan Stuart RN (oncoming nurse) by Manuel Payne RN (offgoing nurse). Report included the following information SBAR, Kardex, Intake/Output, MAR, Recent Results and Med Rec Status. Significant changes during shift:    none   Non-emergent issues for physician to address:   none     Number times ambulated in hallway past shift: 0      Number of times OOB to chair past shift: 0       Vital Signs:    Temp: 99.5 °F (37.5 °C)     Pulse (Heart Rate): 82     BP: 140/87     Resp Rate: 16     O2 Sat (%): 95 %    Lines & Drains:     Urinary Catheter? No   Placement Date:    Medical Necessity:   Central Line? No   Placement Date:    Medical Necessity:   PICC Line? No   Placement Date:    Medical Necessity:     NG tube [] in [] removed [] not applicable   Drains [] in [] removed [] not applicable     Skin Integrity:      Wounds: no   Dressings Present: no    Wound Concerns: no      GI:    Current diet:  DIET REGULAR    Nausea: NO  Vomiting: NO  Bowel Sounds: YES  Flatus: YES  Last Bowel Movement: several days ago   Appearance:     Respiratory:  Supplemental O2: Yes      Device: nasal cannula   via 2 Liters/min     Incentive Spirometer: YES    Coughing and Deep Breathing: YES  Oral Care: YES  Understanding (patient/family education): YES   Getting out of bed: YES  Head of bed elevation: NO    Patient Safety:    Falls Score: 0  Mobility Score:0  Bed Alarm On? No  Sitter? No      Opportunity for questions and clarification was given to oncoming nurse. Patient bed is in LOWEST position, side rails are up x 2, door & observation blinds open as needed, call bell within reach and patient not in distress.     [facility]

## 2017-06-19 NOTE — PROGRESS NOTES
End of Shift Nursing Note    Bedside shift change report given to [facility], RN (oncoming nurse) by Maurice Jacobs RN (offgoing nurse). Report included the following information SBAR, Kardex, Procedure Summary, MAR and Recent Results. Zone Phone:   [phone]    Significant changes during shift:    S/P Thoracentsesis today,  Left back area,  Band aid intact, no bleeding noted. Non-emergent issues for physician to address:   none     Number times ambulated in hallway past shift: 0      Number of times OOB to chair past shift: 1    POD #: N/A,       Vital Signs:    Temp: 98.6 °F (37 °C)     Pulse (Heart Rate): 80     BP: 123/66     Resp Rate: 18     O2 Sat (%): 95 %    Lines & Drains:     Urinary Catheter? No       NG tube [] in [] removed [x] not applicable   Drains [] in [] removed [x] not applicable     Skin Integrity:      Wounds: yes   Dressings Present: yes    Wound Concerns: no      GI:    Current diet:  DIET REGULAR    Nausea: NO  Vomiting: NO  Bowel Sounds: YES  Flatus: YES  Last Bowel Movement: several days ago   Appearance:     Respiratory:  Supplemental O2: Yes      Device: N/C   via 2 Liters/min     Incentive Spirometer: YES  Volume:   Coughing and Deep Breathing: YES  Oral Care: YES  Understanding (patient/family education): YES   Getting out of bed: YES  Head of bed elevation: YES    Patient Safety:    Falls Score: 1  Mobility Score: 1  Bed Alarm On? No  Sitter? No      Opportunity for questions and clarification was given to oncoming nurse. Patient bed is in low position, side rails are up x 2, door & observation blinds open as needed, call bell within reach and patient not in distress.     Leonila Darden RN

## 2017-06-19 NOTE — PROGRESS NOTES
Hospitalist Progress Note    NAME: Darwin Wolfe   :  1965   MRN:  [id number]       Assessment / Plan:  Sepsis POA  Leukocytosis POA- improved slightly but still high- 26k today  Acute respiratory failure POA with hypoxia  Likely underlying Chronic resp failure /COPD/due to smoking  Pleuritic chest pain POA- persistent but improved today as per pt  Due to Pneumonia (LLL) with L Pleural effusion- worsening on repeat CXR yesterday  Lact normal  UA neg    S/p IV Rocephin & Azithro , changed to IV levaquin now-  cont for now  Check Sputum cultures when available  F/u blood cultures - remains negative  Off IVF now  Cont O2 - taper down as able to off if possible- currently on 2L/m only (improved)  Consulted IR to tap the L pleural space to rule out empyema with worsening Leukocytosis today- follow results of Cx  PRN nebs  PRN Norco, cont toradol prn for pleuritic chest pain - seems to be working as per pt     Acute Renal Failure POA- resolved now, Cr 1.0  Hyponatremia POA- resolved  Seems related to dehydration  IVF and monitor lytes     D-dimer, elevated POA  CTA chest neg for PE, confirms LLL ASD + mild Pleural effusion       Code Status: Full  Surrogate Decision Maker: Wife     DVT Prophylaxis: Lovenox  GI Prophylaxis: not indicated     Baseline: functional      Body mass index is 21.62 kg/(m^2). Code status: Full  Recommended Disposition: Home w/Family     Subjective:     Chief Complaint / Reason for Physician Visit F/u Resp failure, PNA, Sepsis  \"I feel ok\". Discussed with RN events overnight.      Review of Systems:  Symptom Y/N Comments  Symptom Y/N Comments   Fever/Chills n Afebrile in past 72 hrs  Chest Pain y L sided on deep breath   Poor Appetite n   Edema     Cough y   Abdominal Pain     Sputum y   Joint Pain     SOB/BERG y improved  Pruritis/Rash     Nausea/vomit    Tolerating PT/OT y    Diarrhea    Tolerating Diet y    Constipation    Other       Could NOT obtain due to:      Objective: VITALS:   Last 24hrs VS reviewed since prior progress note. Most recent are:  Patient Vitals for the past 24 hrs:   Temp Pulse Resp BP SpO2   06/19/17 1229 98.3 °F (36.8 °C) 84 18 128/70 96 %   06/19/17 1136 - 95 20 106/66 95 %   06/19/17 1129 - (!) 102 20 110/64 95 %   06/19/17 1111 - 99 20 121/69 91 %   06/19/17 0733 98.3 °F (36.8 °C) 79 - 127/72 98 %   06/19/17 0358 98.9 °F (37.2 °C) 79 18 114/77 97 %   06/19/17 0000 99.5 °F (37.5 °C) 77 18 120/69 98 %   06/18/17 1956 99.5 °F (37.5 °C) 82 16 140/87 95 %       Intake/Output Summary (Last 24 hours) at 06/19/17 1457  Last data filed at 06/19/17 0843   Gross per 24 hour   Intake              300 ml   Output              975 ml   Net             -675 ml        PHYSICAL EXAM:  General: WD, WN. Alert, cooperative, no acute distress    EENT:  EOMI. Anicteric sclerae. MMM  Resp:  Coarse BS on the L lung filed, decreased BS at the L base +  No accessory muscle use  CV:  Regular  rhythm,  No edema  GI:  Soft, Non distended, Non tender.  +Bowel sounds  Neurologic:  Alert and oriented X 3, normal speech,   Psych:   Good insight. Not anxious nor agitated  Skin:  No rashes. No jaundice    Reviewed most current lab test results and cultures  YES  Reviewed most current radiology test results   YES  Review and summation of old records today    NO  Reviewed patient's current orders and MAR    YES  PMH/SH reviewed - no change compared to H&P  ________________________________________________________________________  Care Plan discussed with:    Comments   Patient x    Family      RN x    Care Manager x Gladis   Consultant                        Multidiciplinary team rounds were held today with , nursing, pharmacist and clinical coordinator. Patient's plan of care was discussed; medications were reviewed and discharge planning was addressed.      ________________________________________________________________________  Total NON critical care TIME:  26   Minutes    Total CRITICAL CARE TIME Spent:   Minutes non procedure based      Comments   >50% of visit spent in counseling and coordination of care     ________________________________________________________________________  Amado Ga MD     Procedures: see electronic medical records for all procedures/Xrays and details which were not copied into this note but were reviewed prior to creation of Plan. LABS:  I reviewed today's most current labs and imaging studies.   Pertinent labs include:  Recent Labs      06/19/17 0352 06/18/17 0521 06/17/17 0431   WBC  26.6*  36.1*  31.9*   HGB  9.3*  9.4*  9.9*   HCT  26.5*  26.9*  29.2*   PLT  499*  446*  370     Recent Labs      06/19/17 0352 06/18/17 0521  06/17/17   0431   NA  134*  135*  137   K  3.6  3.7  3.6   CL  101  101  103   CO2  26  25  25   GLU  100  90  79   BUN  9  8  13   CREA  0.62*  0.67*  0.87   CA  8.6  8.6  8.5       Signed: Amado Ga MD

## 2017-06-20 LAB
ANION GAP BLD CALC-SCNC: 9 MMOL/L (ref 5–15)
BUN SERPL-MCNC: 10 MG/DL (ref 6–20)
BUN/CREAT SERPL: 16 (ref 12–20)
CALCIUM SERPL-MCNC: 9 MG/DL (ref 8.5–10.1)
CHLORIDE SERPL-SCNC: 99 MMOL/L (ref 97–108)
CO2 SERPL-SCNC: 27 MMOL/L (ref 21–32)
CREAT SERPL-MCNC: 0.63 MG/DL (ref 0.7–1.3)
ERYTHROCYTE [DISTWIDTH] IN BLOOD BY AUTOMATED COUNT: 14 % (ref 11.5–14.5)
GLUCOSE SERPL-MCNC: 95 MG/DL (ref 65–100)
HCT VFR BLD AUTO: 26.3 % (ref 36.6–50.3)
HGB BLD-MCNC: 8.9 G/DL (ref 12.1–17)
MCH RBC QN AUTO: 29.4 PG (ref 26–34)
MCHC RBC AUTO-ENTMCNC: 33.8 G/DL (ref 30–36.5)
MCV RBC AUTO: 86.8 FL (ref 80–99)
PLATELET # BLD AUTO: 582 K/UL (ref 150–400)
POTASSIUM SERPL-SCNC: 3.7 MMOL/L (ref 3.5–5.1)
RBC # BLD AUTO: 3.03 M/UL (ref 4.1–5.7)
SODIUM SERPL-SCNC: 135 MMOL/L (ref 136–145)
WBC # BLD AUTO: 23.8 K/UL (ref 4.1–11.1)

## 2017-06-20 PROCEDURE — 74011250636 HC RX REV CODE- 250/636: Performed by: INTERNAL MEDICINE

## 2017-06-20 PROCEDURE — 74011250637 HC RX REV CODE- 250/637: Performed by: INTERNAL MEDICINE

## 2017-06-20 PROCEDURE — 77010033678 HC OXYGEN DAILY

## 2017-06-20 PROCEDURE — 36415 COLL VENOUS BLD VENIPUNCTURE: CPT | Performed by: INTERNAL MEDICINE

## 2017-06-20 PROCEDURE — 80048 BASIC METABOLIC PNL TOTAL CA: CPT | Performed by: INTERNAL MEDICINE

## 2017-06-20 PROCEDURE — 65660000000 HC RM CCU STEPDOWN

## 2017-06-20 PROCEDURE — 74011000258 HC RX REV CODE- 258: Performed by: INTERNAL MEDICINE

## 2017-06-20 PROCEDURE — 85027 COMPLETE CBC AUTOMATED: CPT | Performed by: INTERNAL MEDICINE

## 2017-06-20 RX ADMIN — HYDROCODONE BITARTRATE AND ACETAMINOPHEN 1 TABLET: 5; 325 TABLET ORAL at 19:04

## 2017-06-20 RX ADMIN — SODIUM CHLORIDE 3 G: 900 INJECTION, SOLUTION INTRAVENOUS at 18:59

## 2017-06-20 RX ADMIN — GUAIFENESIN 600 MG: 600 TABLET, EXTENDED RELEASE ORAL at 20:25

## 2017-06-20 RX ADMIN — Medication 10 ML: at 20:26

## 2017-06-20 RX ADMIN — GUAIFENESIN 600 MG: 600 TABLET, EXTENDED RELEASE ORAL at 10:04

## 2017-06-20 RX ADMIN — ENOXAPARIN SODIUM 40 MG: 40 INJECTION SUBCUTANEOUS at 10:04

## 2017-06-20 RX ADMIN — HYDROCODONE BITARTRATE AND ACETAMINOPHEN 1 TABLET: 5; 325 TABLET ORAL at 10:13

## 2017-06-20 RX ADMIN — Medication 10 ML: at 14:20

## 2017-06-20 RX ADMIN — LEVOFLOXACIN 750 MG: 5 INJECTION, SOLUTION INTRAVENOUS at 10:05

## 2017-06-20 RX ADMIN — KETOROLAC TROMETHAMINE 15 MG: 30 INJECTION, SOLUTION INTRAMUSCULAR at 20:25

## 2017-06-20 NOTE — CONSULTS
[facility] Consult Service Note  Pulmonary, Critical Care, and Sleep Medicine    Name: Bernie Gutierrez MRN: [id number]   : 1965 Hospital: [facility]   Date: 2017   Hospital Day: 6       Subjective/Interval History:   I have reviewed the flowsheet and previous days notes. Seen earlier today on rounds. IMPRESSION:   · Acute severe CAP CTA chest neg for PE, confirms LLL ASD + mild Pleural effusion  · Complicated left pleural effusion with empyema and loculation that worsened quickly while on IV bax from Ct chest 6/15 to ultrasound guided thoracentesis ; 30 ml cloudy yellow fluid removed; CXR post tap continues to have thickened left pleura along lateral wall  · Sepsis POA  · Leukocytosis POA- still high  · Acute respiratory failure POA with hypoxia almost off O2  · Pleuritic chest pain POA- persistent but slightly better after fluid removed  · Cigarette smoker in remission x 6 months  · Acute Renal Failure POA- resolved now, Cr 1.0  · Hyponatremia POA- Related to dehydration  · D-dimer, elevated POA      RECOMMENDATIONS/PLAN:   · Will add IV unasyn to IV Levaquin until cultures return- this will cover most common gram positive, gram negative, anaerobes  · Serial CXRs, if effusion enlarges will need chest tube and lytic therapy in hopes to avoid surgical decortication     Code: Full Code      Risk of deterioration: medium   [x] High complexity decision making was performed    Subjective/Initial History:   I have reviewed the flowsheet and previous days notes. I was asked by Dolores Don MD to see Bernie Gutierrez in consultation for a chief complaint of . Bernie Gutierrez is a 47 y.o.  male who presented 6/15 with pleuritic chest pain. As per patient, he had been feeling sick for past 2 weeks. He claimed to have started with cough which was productive with brown sputum.  He reported 8/10 pain upon deep breathing, hurting and lower chest, non radiating, sharp to aching in nature. Pt had fevers, chills, sweats at home. Pt denied any nausea, vomiting, diarrhea, abdominal pain. In ED pt noted to have LLL PNA on CXR, fever, tachycardia and leukocytosis. Chest Ct confirmed pneumonia and small effusion on left. Pt admitted and placed on IV Azithromycin and Ceftriaxone. CXR worsened and he had thoracentesis 6/19 and had 30 ml removed. No hemoptysis. His left chest pain improved some and has almost weaned off O2. No fever. No chills. Denied chest trauma. No PCP. Never had pneumonia vaccines. Quit smoking months ago when he noticed some breathing limitations. Claims to have felt this way years ago but was not diagnosed with pneumonia      No Known Allergies     MAR reviewed and pertinent medications noted or modified as needed     Current Facility-Administered Medications   Medication    ampicillin-sulbactam (UNASYN) 3 g in 0.9% sodium chloride (MBP/ADV) 100 mL    levoFLOXacin (LEVAQUIN) 750 mg in D5W IVPB    ketorolac (TORADOL) injection 15 mg    sodium chloride (NS) flush 5-10 mL    HYDROcodone-acetaminophen (NORCO) 5-325 mg per tablet 1 Tab    albuterol-ipratropium (DUO-NEB) 2.5 MG-0.5 MG/3 ML    sodium chloride (NS) flush 5-10 mL    sodium chloride (NS) flush 5-10 mL    acetaminophen (TYLENOL) tablet 650 mg    ondansetron (ZOFRAN) injection 4 mg    enoxaparin (LOVENOX) injection 40 mg    guaiFENesin-dextromethorphan (ROBITUSSIN DM) 100-10 mg/5 mL syrup 10 mL    guaiFENesin ER (MUCINEX) tablet 600 mg        PMH:  has no past medical history on file. PSH:   has no past surgical history on file. FHX: family history is not on file. SHX:       ROS:A comprehensive review of systems was negative except for that written in the HPI.     Objective:     Vital Signs: Intake/Output: Intake/Output:   Visit Vitals    /73 (BP 1 Location: Right arm, BP Patient Position: At rest)    Pulse 81    Temp 99.1 °F (37.3 °C)    Resp 16    Ht 5' 8\" (1.727 m)    Wt 64.5 kg (142 lb 3.2 oz)    SpO2 96%    BMI 21.62 kg/m2      O2 Device: Nasal cannula  O2 Flow Rate (L/min): 2 l/min  Temp (24hrs), Av.5 °F (36.9 °C), Min:97.2 °F (36.2 °C), Max:99.4 °F (37.4 °C)     Intake/Output Summary (Last 24 hours) at 17 1750  Last data filed at 17 1518   Gross per 24 hour   Intake              840 ml   Output              750 ml   Net               90 ml    Last shift:      701 - 1900  In: 600 [P.O.:600]  Out: 450 [Urine:450]  Last 3 shifts: 1901 -  0700  In: 7511 [P.O.:870; I.V.:150]  Out: 1450 [Urine:1450]           Physical Exam:    General: lean AAM in no respiratory distress and acyanotic, alert, oriented times 3 and moderately ill   HEENT: NCAT, fair dentition with worn teeth and partially edentulous; No Thrush; class _ airway   Neck: No abnormally enlarged lymph nodes. Chest: normal   Lungs: normal air entry   Heart: Regular rate and rhythm   Abdomen: soft, non-tender, without masses or organomegaly   :    Extremity: negative, cyanosis, clubbing    Neuro: alert   Psych: oriented to time, place and person   Skin: Skin unremarkable;    Pulses:Bilateral, Radial, 2+   Capillary refill: normal warm,    Data:                 Labs:  Recent Labs      172  17   0521   WBC  23.8*  26.6*  36.1*   HGB  8.9*  9.3*  9.4*   HCT  26.3*  26.5*  26.9*   PLT  582*  499*  446*     Recent Labs      17   0352  17   0521   NA  135*  134*  135*   K  3.7  3.6  3.7   CL  99  101  101   CO2  27  26  25   GLU  95  100  90   BUN  10  9  8   CREA  0.63*  0.62*  0.67*   CA  9.0  8.6  8.6     No results for input(s): PH, PCO2, PO2, HCO3, FIO2 in the last 72 hours.     Lab Results   Component Value Date/Time    Sodium 135 2017 04:29 AM    Potassium 3.7 2017 04:29 AM    Chloride 99 2017 04:29 AM    CO2 27 2017 04:29 AM    BUN 10 2017 04:29 AM    Creatinine 0.63 06/20/2017 04:29 AM    Glucose 95 06/20/2017 04:29 AM    Calcium 9.0 06/20/2017 04:29 AM    Lactic acid 1.1 06/15/2017 05:21 AM       Lab Results   Component Value Date/Time    WBC 23.8 06/20/2017 04:29 AM    HGB 8.9 06/20/2017 04:29 AM    PLATELET 485 69/76/4994 04:29 AM    MCV 86.8 06/20/2017 04:29 AM     Lab Results   Component Value Date/Time    AST (SGOT) 24 06/16/2017 04:41 AM    Alk. phosphatase 183 06/16/2017 04:41 AM    Protein, total 7.6 06/16/2017 04:41 AM    Albumin 2.2 06/16/2017 04:41 AM    Globulin 5.4 06/16/2017 04:41 AM     No results found for: CPK, RCK1, RCK2, RCK3, RCK4, CKNDX, CKND1, TROPT, TROIQ, BNPP, BNP   Lab Results   Component Value Date/Time    Color DARK YELLOW 06/15/2017 09:06 AM    Appearance CLEAR 06/15/2017 09:06 AM    pH (UA) 5.0 06/15/2017 09:06 AM    Protein NEGATIVE  06/15/2017 09:06 AM    Glucose NEGATIVE  06/15/2017 09:06 AM    Ketone NEGATIVE  06/15/2017 09:06 AM    Blood NEGATIVE  06/15/2017 09:06 AM    Urobilinogen 2.0 06/15/2017 09:06 AM    Nitrites NEGATIVE  06/15/2017 09:06 AM    Leukocyte Esterase TRACE 06/15/2017 09:06 AM    WBC 0-4 06/15/2017 09:06 AM    RBC 0-5 06/15/2017 09:06 AM    Bacteria NEGATIVE  06/15/2017 09:06 AM     Lab Results   Component Value Date/Time    Culture result: NO GROWTH AFTER 12 HOURS 06/18/2017 11:40 AM    Culture result: NO GROWTH 5 DAYS 06/15/2017 05:21 AM    Culture result: NO GROWTH 5 DAYS 06/15/2017 05:21 AM       No results found for: TOXA1, RPR, HBCM, HBSAG, HAAB, HCAB1, HAAT, G6PD, CRYAC, HIVGT, HIVR, HIV1, HIV12, HIVPC, HIVRPI  No results found for: IRON, FE, TIBC, IBCT, PSAT, FERR  No results found for: SR, CRP, LAURA, ANAIGG, RA, RPR, RPRT, VDRLT, VDRLS, TSH, TSHEXT    Imaging:  I have personally reviewed the patients radiographs and have reviewed the reports:        Results from Hospital Encounter encounter on 06/15/17   XR CHEST PORT   Narrative Clinical indication: Left pleural effusion, recent thoracentesis.     AP portable erect view of the chest obtained after a left thoracentesis show  persistent fluid partially resolving loculated with some infiltrate at the left  lung base. The right lung is clear. There is no pneumothorax or shift. Impression impression: No acute findings post left thoracentesis. Results from Hospital Encounter encounter on 06/15/17   CTA CHEST W OR W WO CONT   Narrative EXAM:  CTA CHEST W OR W WO CONT    INDICATION:   Fever, Tachycardic, Hypoxic, PE?    COMPARISON: Radiographs same day. CONTRAST:  80 mL of Isovue-370. TECHNIQUE:   Precontrast  images were obtained to localize the volume for acquisition. Multislice helical CT arteriography was performed from the diaphragm to the  thoracic inlet during uneventful rapid bolus intravenous contrast  administration. Lung and soft tissue windows were generated. Coronal and  sagittal images were generated and 3D post processing consisting of coronal  maximum intensity images was performed. CT dose reduction was achieved through  use of a standardized protocol tailored for this examination and automatic  exposure control for dose modulation. FINDINGS:  There is a mild left pleural effusion. There is mild centrilobular emphysema. Patchy opacities are seen in the left base which may related to atelectasis  versus airspace disease. No evidence of a worrisome pulmonary nodule. The pulmonary arteries are well enhanced and no pulmonary emboli are identified. There is a 9 mm node in the AP window node is likely reactive. Additional  subcentimeter lymph nodes are scattered throughout the mediastinum and both  juan. No evidence of a mass. The aorta enhances normally without evidence of  aneurysm or dissection. There is a 10 x 15 mm cyst in the anterior left hepatic lobe. The remainder the  visualized subdiaphragmatic viscera appears unremarkable. .         Impression IMPRESSION:   1. Negative for pulmonary embolus.   2. Mild left pleural effusion with mild left basilar atelectasis or airspace  disease. My assessment/plan was discussed with:  nursing    respiratory therapy Dr. cooley      Complex decision making was performed which includes reviewing the patient's past medical records, current laboratory results, medications, ECG and actual Xray films, immediately available at the bedside to the patient    Thank you for allowing us to participate in the care of this patient. We will be happy to follow with you.     Damir Farias MD

## 2017-06-20 NOTE — PROGRESS NOTES
Hospitalist Progress Note    NAME: Randa Estrada   :  1965   MRN:  [id number]       Assessment / Plan:  Sepsis POA  Leukocytosis POA- improved slightly but still high  Acute respiratory failure POA with hypoxia  Likely underlying Chronic resp failure /COPD/due to smoking  Pleuritic chest pain POA- persistent   Due to Pneumonia (LLL) with L Pleural effusion/empyema- worsening on repeat CXR ; no acute findings after tap on   Lact normal  UA neg    S/p IV Rocephin & Azithro , changed to IV levaquin now-  cont for now  F/u blood cultures - remains negative  pulm consulted   Cont O2 - taper down as able to off if possible- currently on 2L/m only (improved)  Consulted IR to tap the L pleural space  - WBC but no organisms seen  PRN nebs  PRN Norco, cont toradol prn for pleuritic chest pain     Acute Renal Failure POA- resolved now, Cr 1.0  Hyponatremia POA- monitor  Related to dehydration  Off IVF now    D-dimer, elevated POA  CTA chest neg for PE, confirms LLL ASD + mild Pleural effusion       Code Status: Full  Surrogate Decision Maker: Wife     DVT Prophylaxis: Lovenox  GI Prophylaxis: not indicated     Baseline: functional      Body mass index is 21.62 kg/(m^2). Code status: Full  Recommended Disposition: Home w/Family     Subjective:     Chief Complaint / Reason for Physician Visit F/u Resp failure, PNA, Sepsis  No complaints, wonders when he can go home. Discussed with RN events overnight. Review of Systems:  Symptom Y/N Comments  Symptom Y/N Comments   Fever/Chills    Chest Pain y L sided on deep breath   Poor Appetite    Edema     Cough y   Abdominal Pain     Sputum y   Joint Pain     SOB/BERG y improved  Pruritis/Rash     Nausea/vomit n   Tolerating PT/OT     Diarrhea n   Tolerating Diet y    Constipation    Other       Could NOT obtain due to:      Objective:     VITALS:   Last 24hrs VS reviewed since prior progress note.  Most recent are:  Patient Vitals for the past 24 hrs:   Temp Pulse Resp BP SpO2   06/20/17 1000 98.2 °F (36.8 °C) 86 15 141/80 98 %   06/20/17 0411 99.4 °F (37.4 °C) 88 16 134/71 96 %   06/19/17 2348 98.8 °F (37.1 °C) 74 18 123/71 98 %   06/19/17 2010 97.2 °F (36.2 °C) 81 18 119/65 98 %   06/19/17 1536 98.6 °F (37 °C) 80 18 123/66 95 %   06/19/17 1229 98.3 °F (36.8 °C) 84 18 128/70 96 %   06/19/17 1136 - 95 20 106/66 95 %   06/19/17 1129 - (!) 102 20 110/64 95 %   06/19/17 1111 - 99 20 121/69 91 %       Intake/Output Summary (Last 24 hours) at 06/20/17 1009  Last data filed at 06/20/17 0433   Gross per 24 hour   Intake              720 ml   Output              475 ml   Net              245 ml        PHYSICAL EXAM:  General: WD, WN. Alert, cooperative, no acute distress    EENT:  EOMI. Anicteric sclerae. MMM  Resp:  Decreased BS in bases b/l. No accessory muscle use  CV:  Regular  rhythm,  No edema  GI:  Soft, Non distended, Non tender.  +Bowel sounds  Neurologic:  Alert and oriented X 3, normal speech,   Psych:   Good insight. Not anxious nor agitated  Skin:    No jaundice    Reviewed most current lab test results and cultures  YES  Reviewed most current radiology test results   YES  Review and summation of old records today    NO  Reviewed patient's current orders and MAR    YES  PMH/SH reviewed - no change compared to H&P  ________________________________________________________________________  Care Plan discussed with:    Comments   Patient x    Family      RN x    Care Manager     Consultant                        Multidiciplinary team rounds were held today with , nursing, pharmacist and clinical coordinator. Patient's plan of care was discussed; medications were reviewed and discharge planning was addressed.      ________________________________________________________________________  Total NON critical care TIME:    Minutes    Total CRITICAL CARE TIME Spent:   Minutes non procedure based      Comments   >50% of visit spent in counseling and coordination of care     ________________________________________________________________________  Barbra Aguilera MD     Procedures: see electronic medical records for all procedures/Xrays and details which were not copied into this note but were reviewed prior to creation of Plan. LABS:  I reviewed today's most current labs and imaging studies.   Pertinent labs include:  Recent Labs      06/20/17 0429 06/19/17 0352 06/18/17   0521   WBC  23.8*  26.6*  36.1*   HGB  8.9*  9.3*  9.4*   HCT  26.3*  26.5*  26.9*   PLT  582*  499*  446*     Recent Labs      06/20/17 0429 06/19/17 0352  06/18/17   0521   NA  135*  134*  135*   K  3.7  3.6  3.7   CL  99  101  101   CO2  27  26  25   GLU  95  100  90   BUN  10  9  8   CREA  0.63*  0.62*  0.67*   CA  9.0  8.6  8.6       Signed: Barbra Aguilera MD

## 2017-06-20 NOTE — PROGRESS NOTES
End of Shift Nursing Note    Bedside shift change report given to Erwin (oncoming nurse) by  Royce Maza (offgoing nurse). Report included the following information SBAR, Kardex, Intake/Output, MAR and Recent Results. Zone Phone:       Significant changes during shift:    Rested quietly through the night, medicated x 1 for pain 6/10 with some relief. Non-emergent issues for physician to address:   None     Number times ambulated in hallway past shift: 0      Number of times OOB to chair past shift: 0    POD #:      Vital Signs:    Temp: 99.4 °F (37.4 °C)     Pulse (Heart Rate): 88     BP: 134/71     Resp Rate: 16     O2 Sat (%): 96 %    Lines & Drains:     Urinary Catheter? No   Placement Date:    Medical Necessity:   Central Line? No   Placement Date:    Medical Necessity:   PICC Line? No   Placement Date:    Medical Necessity:     NG tube [] in [] removed [] not applicable   Drains [] in [] removed [] not applicable     Skin Integrity:      Wounds: no   Dressings Present: no    Wound Concerns: no      GI:    Current diet:  DIET REGULAR    Nausea: NO  Vomiting: NO  Bowel Sounds: YES  Flatus: YES  Last Bowel Movement: several days ago   Appearance: Unobserved    Respiratory:  Supplemental O2: Yes      Device: Nasal cannula   via 2 Liters/min     Incentive Spirometer: YES  Volume:   Coughing and Deep Breathing: YES  Oral Care: YES  Understanding (patient/family education): YES   Getting out of bed: YES  Head of bed elevation: YES    Patient Safety:    Falls Score: 1  Mobility Score: 4  Bed Alarm On? No  Sitter? No      Opportunity for questions and clarification was given to oncoming nurse. Patient bed is in low position, side rails are up x 2, door & observation blinds open as needed, call bell within reach and patient not in distress.     Katja Mcnulty

## 2017-06-20 NOTE — ROUTINE PROCESS
End of Shift Nursing Note    Bedside shift change report given to Yari Goode RN (oncoming nurse) by Yazmin Dallas RN(offgoing nurse). Report included the following information SBAR, Kardex, Intake/Output, MAR and Recent Results. Zone Phone:      Significant changes during shift:    none   Non-emergent issues for physician to address:   none     Number times ambulated in hallway past shift: 0      Number of times OOB to chair past shift: 0       Vital Signs:    Temp: 99.1 °F (37.3 °C)     Pulse (Heart Rate): 81     BP: 131/73     Resp Rate: 16     O2 Sat (%): 96 %    Lines & Drains:     Urinary Catheter? No   Placement Date:    Medical Necessity:   Central Line? No   Placement Date:    Medical Necessity:   PICC Line? No   Placement Date:    Medical Necessity:     NG tube [] in [] removed [x] not applicable   Drains [] in [] removed [x] not applicable     Skin Integrity:      Wounds: no   Dressings Present: no    Wound Concerns: no      GI:    Current diet:  DIET REGULAR    Nausea: NO  Vomiting: NO  Bowel Sounds: YES  Flatus: YES  Last Bowel Movement: several days ago   Appearance:     Respiratory:  Supplemental O2: Yes      Device: nasal cannula   via 2 Liters/min     Incentive Spirometer: YES    Coughing and Deep Breathing: YES  Oral Care: YES  Understanding (patient/family education): YES   Getting out of bed: YES  Head of bed elevation: YES    Patient Safety:    Falls Score:0  Mobility Score: 0  Bed Alarm On? No  Sitter? No      Opportunity for questions and clarification was given to oncoming nurse. Patient bed is in lowest position, side rails are up x 2, door & observation blinds open as needed, call bell within reach and patient not in distress.     [facility]

## 2017-06-21 LAB
BACTERIA SPEC CULT: NORMAL
BACTERIA SPEC CULT: NORMAL
SERVICE CMNT-IMP: NORMAL
SERVICE CMNT-IMP: NORMAL

## 2017-06-21 PROCEDURE — 74011250637 HC RX REV CODE- 250/637: Performed by: INTERNAL MEDICINE

## 2017-06-21 PROCEDURE — 74011000258 HC RX REV CODE- 258: Performed by: INTERNAL MEDICINE

## 2017-06-21 PROCEDURE — 74011250636 HC RX REV CODE- 250/636: Performed by: INTERNAL MEDICINE

## 2017-06-21 PROCEDURE — G8979 MOBILITY GOAL STATUS: HCPCS | Performed by: PHYSICAL THERAPIST

## 2017-06-21 PROCEDURE — 97161 PT EVAL LOW COMPLEX 20 MIN: CPT | Performed by: PHYSICAL THERAPIST

## 2017-06-21 PROCEDURE — 97116 GAIT TRAINING THERAPY: CPT | Performed by: PHYSICAL THERAPIST

## 2017-06-21 PROCEDURE — 97530 THERAPEUTIC ACTIVITIES: CPT | Performed by: PHYSICAL THERAPIST

## 2017-06-21 PROCEDURE — 65660000000 HC RM CCU STEPDOWN

## 2017-06-21 PROCEDURE — G8978 MOBILITY CURRENT STATUS: HCPCS | Performed by: PHYSICAL THERAPIST

## 2017-06-21 RX ORDER — HYDROCODONE BITARTRATE AND ACETAMINOPHEN 5; 325 MG/1; MG/1
1-2 TABLET ORAL
Status: DISCONTINUED | OUTPATIENT
Start: 2017-06-21 | End: 2017-06-22 | Stop reason: HOSPADM

## 2017-06-21 RX ADMIN — Medication 10 ML: at 00:11

## 2017-06-21 RX ADMIN — Medication 10 ML: at 21:30

## 2017-06-21 RX ADMIN — LEVOFLOXACIN 750 MG: 5 INJECTION, SOLUTION INTRAVENOUS at 08:01

## 2017-06-21 RX ADMIN — SODIUM CHLORIDE 3 G: 900 INJECTION, SOLUTION INTRAVENOUS at 11:54

## 2017-06-21 RX ADMIN — Medication 10 ML: at 00:13

## 2017-06-21 RX ADMIN — HYDROCODONE BITARTRATE AND ACETAMINOPHEN 2 TABLET: 5; 325 TABLET ORAL at 17:27

## 2017-06-21 RX ADMIN — Medication 10 ML: at 06:00

## 2017-06-21 RX ADMIN — Medication 10 ML: at 08:06

## 2017-06-21 RX ADMIN — HYDROCODONE BITARTRATE AND ACETAMINOPHEN 2 TABLET: 5; 325 TABLET ORAL at 21:30

## 2017-06-21 RX ADMIN — SODIUM CHLORIDE 3 G: 900 INJECTION, SOLUTION INTRAVENOUS at 00:12

## 2017-06-21 RX ADMIN — KETOROLAC TROMETHAMINE 15 MG: 30 INJECTION, SOLUTION INTRAMUSCULAR at 08:02

## 2017-06-21 RX ADMIN — SODIUM CHLORIDE 3 G: 900 INJECTION, SOLUTION INTRAVENOUS at 05:59

## 2017-06-21 RX ADMIN — HYDROCODONE BITARTRATE AND ACETAMINOPHEN 2 TABLET: 5; 325 TABLET ORAL at 12:01

## 2017-06-21 RX ADMIN — SODIUM CHLORIDE 3 G: 900 INJECTION, SOLUTION INTRAVENOUS at 17:27

## 2017-06-21 RX ADMIN — GUAIFENESIN 600 MG: 600 TABLET, EXTENDED RELEASE ORAL at 08:01

## 2017-06-21 RX ADMIN — GUAIFENESIN 600 MG: 600 TABLET, EXTENDED RELEASE ORAL at 21:30

## 2017-06-21 NOTE — PROGRESS NOTES
[facility] Consult Service Note  Pulmonary, Critical Care, and Sleep Medicine    Name: Zeke Irby MRN: [id number]   : 1965 Hospital: [facility]   Date: 2017   Hospital Day: 7       Subjective/Interval History:   I have reviewed the flowsheet and previous days notes. Seen earlier today on rounds.  off O2. Chest pain lingering but using incentive spirometer. 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 He should get another CXR in 4-6 weeks to make sure it resolves;   · If effusion worsens, would need chest tube for lytic therapy in hopes to avoid surgical decortication     Code: Full Code      Risk of deterioration: medium   [x] High complexity decision making was performed    Subjective/Initial History:   I have reviewed the flowsheet and previous days notes. I was asked by Wolfgang De Los Santos MD to see Zeke Irby in consultation for a chief complaint of . Zeke Irby is a 47 y.o.    male who presented 6/15 with pleuritic chest pain. As per patient, he had been feeling sick for past 2 weeks. He claimed to have started with cough which was productive with brown sputum. He reported 8/10 pain upon deep breathing, hurting and lower chest, non radiating, sharp to aching in nature. Pt had fevers, chills, sweats at home. Pt denied any nausea, vomiting, diarrhea, abdominal pain. In ED pt noted to have LLL PNA on CXR, fever, tachycardia and leukocytosis. Chest Ct confirmed pneumonia and small effusion on left. Pt admitted and placed on IV Azithromycin and Ceftriaxone. CXR worsened and he had thoracentesis 6/19 and had 30 ml removed. No hemoptysis. His left chest pain improved some and has almost weaned off O2. No fever. No chills. Denied chest trauma. No PCP. Never had pneumonia vaccines. Quit smoking months ago when he noticed some breathing limitations. Claims to have felt this way years ago but was not diagnosed with pneumonia      No Known Allergies     MAR reviewed and pertinent medications noted or modified as needed     Current Facility-Administered Medications   Medication    HYDROcodone-acetaminophen (NORCO) 5-325 mg per tablet 1-2 Tab    ampicillin-sulbactam (UNASYN) 3 g in 0.9% sodium chloride (MBP/ADV) 100 mL    levoFLOXacin (LEVAQUIN) 750 mg in D5W IVPB    sodium chloride (NS) flush 5-10 mL    albuterol-ipratropium (DUO-NEB) 2.5 MG-0.5 MG/3 ML    sodium chloride (NS) flush 5-10 mL    sodium chloride (NS) flush 5-10 mL    acetaminophen (TYLENOL) tablet 650 mg    ondansetron (ZOFRAN) injection 4 mg    enoxaparin (LOVENOX) injection 40 mg    guaiFENesin-dextromethorphan (ROBITUSSIN DM) 100-10 mg/5 mL syrup 10 mL    guaiFENesin ER (MUCINEX) tablet 600 mg        PMH:  has no past medical history on file. PSH:   has no past surgical history on file. FHX: family history is not on file.    SHX:       ROS:A comprehensive review of systems was negative except for that written in the HPI.    Objective:     Vital Signs: Intake/Output: Intake/Output:   Visit Vitals    /73 (BP 1 Location: Left arm, BP Patient Position: Sitting)    Pulse 83    Temp 98 °F (36.7 °C)    Resp 18    Ht 5' 8\" (1.727 m)    Wt 64.5 kg (142 lb 3.2 oz)    SpO2 95%    BMI 21.62 kg/m2      O2 Device: Room air  O2 Flow Rate (L/min): 2 l/min  Temp (24hrs), Av.3 °F (36.8 °C), Min:97.7 °F (36.5 °C), Max:99.1 °F (37.3 °C)     Intake/Output Summary (Last 24 hours) at 17 1416  Last data filed at 17 1227   Gross per 24 hour   Intake             1340 ml   Output             1050 ml   Net              290 ml    Last shift:       07 -  190  In: 840 [P.O.:840]  Out: -   Last 3 shifts: 1901 -  0700  In: 800 [P.O.:800]  Out: 1350 [Urine:1350]           Physical Exam:    General: lean AAM in no respiratory distress and acyanotic, alert, oriented times 3 and moderately ill   HEENT: NCAT, fair dentition with worn teeth and partially edentulous; No Thrush; class _ airway   Neck: No abnormally enlarged lymph nodes. Chest: normal   Lungs: normal air entry   Heart: Regular rate and rhythm   Abdomen: soft, non-tender, without masses or organomegaly   :    Extremity: negative, cyanosis, clubbing    Neuro: alert   Psych: oriented to time, place and person   Skin: Skin unremarkable;    Pulses:Bilateral, Radial, 2+   Capillary refill: normal warm,    Data:                 Labs:  Recent Labs      17   0429  17   0352   WBC  23.8*  26.6*   HGB  8.9*  9.3*   HCT  26.3*  26.5*   PLT  582*  499*     Recent Labs      17   0429  17   0352   NA  135*  134*   K  3.7  3.6   CL  99  101   CO2  27  26   GLU  95  100   BUN  10  9   CREA  0.63*  0.62*   CA  9.0  8.6     No results for input(s): PH, PCO2, PO2, HCO3, FIO2 in the last 72 hours.     Lab Results   Component Value Date/Time    Sodium 135 2017 04:29 AM    Potassium 3.7 2017 04:29 AM    Chloride 99 2017 04:29 AM    CO2 27 06/20/2017 04:29 AM    BUN 10 06/20/2017 04:29 AM    Creatinine 0.63 06/20/2017 04:29 AM    Glucose 95 06/20/2017 04:29 AM    Calcium 9.0 06/20/2017 04:29 AM    Lactic acid 1.1 06/15/2017 05:21 AM       Lab Results   Component Value Date/Time    WBC 23.8 06/20/2017 04:29 AM    HGB 8.9 06/20/2017 04:29 AM    PLATELET 903 40/15/3239 04:29 AM    MCV 86.8 06/20/2017 04:29 AM     Lab Results   Component Value Date/Time    AST (SGOT) 24 06/16/2017 04:41 AM    Alk.  phosphatase 183 06/16/2017 04:41 AM    Protein, total 7.6 06/16/2017 04:41 AM    Albumin 2.2 06/16/2017 04:41 AM    Globulin 5.4 06/16/2017 04:41 AM     No results found for: CPK, RCK1, RCK2, RCK3, RCK4, CKNDX, CKND1, TROPT, TROIQ, BNPP, BNP   Lab Results   Component Value Date/Time    Color DARK YELLOW 06/15/2017 09:06 AM    Appearance CLEAR 06/15/2017 09:06 AM    pH (UA) 5.0 06/15/2017 09:06 AM    Protein NEGATIVE  06/15/2017 09:06 AM    Glucose NEGATIVE  06/15/2017 09:06 AM    Ketone NEGATIVE  06/15/2017 09:06 AM    Blood NEGATIVE  06/15/2017 09:06 AM    Urobilinogen 2.0 06/15/2017 09:06 AM    Nitrites NEGATIVE  06/15/2017 09:06 AM    Leukocyte Esterase TRACE 06/15/2017 09:06 AM    WBC 0-4 06/15/2017 09:06 AM    RBC 0-5 06/15/2017 09:06 AM    Bacteria NEGATIVE  06/15/2017 09:06 AM     Lab Results   Component Value Date/Time    Culture result: NO GROWTH 2 DAYS 06/18/2017 11:40 AM    Culture result: NO GROWTH 6 DAYS 06/15/2017 05:21 AM    Culture result: NO GROWTH 6 DAYS 06/15/2017 05:21 AM       No results found for: TOXA1, RPR, HBCM, HBSAG, HAAB, HCAB1, HAAT, G6PD, CRYAC, HIVGT, HIVR, HIV1, HIV12, HIVPC, HIVRPI  No results found for: IRON, FE, TIBC, IBCT, PSAT, FERR  No results found for: SR, CRP, LAURA, ANAIGG, RA, RPR, RPRT, VDRLT, VDRLS, TSH, TSHEXT, TSHEXT    Imaging:  I have personally reviewed the patients radiographs and have reviewed the reports:          Results from Hospital Encounter encounter on 06/15/17   XR CHEST PORT   Narrative Clinical indication: Left pleural effusion, recent thoracentesis. AP portable erect view of the chest obtained after a left thoracentesis show  persistent fluid partially resolving loculated with some infiltrate at the left  lung base. The right lung is clear. There is no pneumothorax or shift. Impression impression: No acute findings post left thoracentesis. Results from Hospital Encounter encounter on 06/15/17   CTA CHEST W OR W WO CONT   Narrative EXAM:  CTA CHEST W OR W WO CONT    INDICATION:   Fever, Tachycardic, Hypoxic, PE?    COMPARISON: Radiographs same day. CONTRAST:  80 mL of Isovue-370. TECHNIQUE:   Precontrast  images were obtained to localize the volume for acquisition. Multislice helical CT arteriography was performed from the diaphragm to the  thoracic inlet during uneventful rapid bolus intravenous contrast  administration. Lung and soft tissue windows were generated. Coronal and  sagittal images were generated and 3D post processing consisting of coronal  maximum intensity images was performed. CT dose reduction was achieved through  use of a standardized protocol tailored for this examination and automatic  exposure control for dose modulation. FINDINGS:  There is a mild left pleural effusion. There is mild centrilobular emphysema. Patchy opacities are seen in the left base which may related to atelectasis  versus airspace disease. No evidence of a worrisome pulmonary nodule. The pulmonary arteries are well enhanced and no pulmonary emboli are identified. There is a 9 mm node in the AP window node is likely reactive. Additional  subcentimeter lymph nodes are scattered throughout the mediastinum and both  juan. No evidence of a mass. The aorta enhances normally without evidence of  aneurysm or dissection. There is a 10 x 15 mm cyst in the anterior left hepatic lobe. The remainder the  visualized subdiaphragmatic viscera appears unremarkable. Zohreh Galvez Impression IMPRESSION:   1. Negative for pulmonary embolus. 2. Mild left pleural effusion with mild left basilar atelectasis or airspace  disease. My assessment/plan was discussed with:  nursing    respiratory therapy Dr. cooley      Complex decision making was performed which includes reviewing the patient's past medical records, current laboratory results, medications, ECG and actual Xray films, immediately available at the bedside to the patient    Thank you for allowing us to participate in the care of this patient. We will be happy to follow with you.     Damian Cunningham MD

## 2017-06-21 NOTE — PROGRESS NOTES
Problem: Pain - Acute  Goal: *Pain is controlled to three or less  Outcome: Progressing Towards Goal  Assess pain Q4h & prn. Medicating with 2 PO Norco Q4h.

## 2017-06-21 NOTE — CONSULTS
[facility] Consult Service Note  Pulmonary, Critical Care, and Sleep Medicine    Name: Mckayla Rodríguez MRN: [id number]   : 1965 Hospital: [facility]   Date: 2017   Hospital Day: 7       Subjective/Interval History:   I have reviewed the flowsheet and previous days notes. Seen earlier today on rounds.  off O2. Chest pain lingering but using incentive spirometer. 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 He should get another CXR in 4-6 weeks to make sure it resolves;   · If effusion worsens, would need chest tube for lytic therapy in hopes to avoid surgical decortication     Code: Full Code      Risk of deterioration: medium   [x] High complexity decision making was performed    Subjective/Initial History:   I have reviewed the flowsheet and previous days notes. I was asked by Sophie Ibrahim MD to see Mckayla Rodríguez in consultation for a chief complaint of . Mckayla Rodríguez is a 47 y.o.    male who presented 6/15 with pleuritic chest pain. As per patient, he had been feeling sick for past 2 weeks. He claimed to have started with cough which was productive with brown sputum. He reported 8/10 pain upon deep breathing, hurting and lower chest, non radiating, sharp to aching in nature. Pt had fevers, chills, sweats at home. Pt denied any nausea, vomiting, diarrhea, abdominal pain. In ED pt noted to have LLL PNA on CXR, fever, tachycardia and leukocytosis. Chest Ct confirmed pneumonia and small effusion on left. Pt admitted and placed on IV Azithromycin and Ceftriaxone. CXR worsened and he had thoracentesis 6/19 and had 30 ml removed. No hemoptysis. His left chest pain improved some and has almost weaned off O2. No fever. No chills. Denied chest trauma. No PCP. Never had pneumonia vaccines. Quit smoking months ago when he noticed some breathing limitations. Claims to have felt this way years ago but was not diagnosed with pneumonia      No Known Allergies     MAR reviewed and pertinent medications noted or modified as needed     Current Facility-Administered Medications   Medication    HYDROcodone-acetaminophen (NORCO) 5-325 mg per tablet 1-2 Tab    ampicillin-sulbactam (UNASYN) 3 g in 0.9% sodium chloride (MBP/ADV) 100 mL    levoFLOXacin (LEVAQUIN) 750 mg in D5W IVPB    sodium chloride (NS) flush 5-10 mL    albuterol-ipratropium (DUO-NEB) 2.5 MG-0.5 MG/3 ML    sodium chloride (NS) flush 5-10 mL    sodium chloride (NS) flush 5-10 mL    acetaminophen (TYLENOL) tablet 650 mg    ondansetron (ZOFRAN) injection 4 mg    enoxaparin (LOVENOX) injection 40 mg    guaiFENesin-dextromethorphan (ROBITUSSIN DM) 100-10 mg/5 mL syrup 10 mL    guaiFENesin ER (MUCINEX) tablet 600 mg        PMH:  has no past medical history on file. PSH:   has no past surgical history on file. FHX: family history is not on file.    SHX:       ROS:A comprehensive review of systems was negative except for that written in the HPI.    Objective:     Vital Signs: Intake/Output: Intake/Output:   Visit Vitals    /73 (BP 1 Location: Left arm, BP Patient Position: Sitting)    Pulse 83    Temp 98 °F (36.7 °C)    Resp 18    Ht 5' 8\" (1.727 m)    Wt 64.5 kg (142 lb 3.2 oz)    SpO2 95%    BMI 21.62 kg/m2      O2 Device: Room air  O2 Flow Rate (L/min): 2 l/min  Temp (24hrs), Av.3 °F (36.8 °C), Min:97.7 °F (36.5 °C), Max:99.1 °F (37.3 °C)     Intake/Output Summary (Last 24 hours) at 17 1413  Last data filed at 17 1227   Gross per 24 hour   Intake             1340 ml   Output             1050 ml   Net              290 ml    Last shift:       07 -  190  In: 840 [P.O.:840]  Out: -   Last 3 shifts: 1901 -  0700  In: 800 [P.O.:800]  Out: 1350 [Urine:1350]           Physical Exam:    General: lean AAM in no respiratory distress and acyanotic, alert, oriented times 3 and moderately ill   HEENT: NCAT, fair dentition with worn teeth and partially edentulous; No Thrush; class _ airway   Neck: No abnormally enlarged lymph nodes. Chest: normal   Lungs: normal air entry   Heart: Regular rate and rhythm   Abdomen: soft, non-tender, without masses or organomegaly   :    Extremity: negative, cyanosis, clubbing    Neuro: alert   Psych: oriented to time, place and person   Skin: Skin unremarkable;    Pulses:Bilateral, Radial, 2+   Capillary refill: normal warm,    Data:                 Labs:  Recent Labs      17   0429  17   0352   WBC  23.8*  26.6*   HGB  8.9*  9.3*   HCT  26.3*  26.5*   PLT  582*  499*     Recent Labs      17   0429  17   0352   NA  135*  134*   K  3.7  3.6   CL  99  101   CO2  27  26   GLU  95  100   BUN  10  9   CREA  0.63*  0.62*   CA  9.0  8.6     No results for input(s): PH, PCO2, PO2, HCO3, FIO2 in the last 72 hours.     Lab Results   Component Value Date/Time    Sodium 135 2017 04:29 AM    Potassium 3.7 2017 04:29 AM    Chloride 99 2017 04:29 AM    CO2 27 06/20/2017 04:29 AM    BUN 10 06/20/2017 04:29 AM    Creatinine 0.63 06/20/2017 04:29 AM    Glucose 95 06/20/2017 04:29 AM    Calcium 9.0 06/20/2017 04:29 AM    Lactic acid 1.1 06/15/2017 05:21 AM       Lab Results   Component Value Date/Time    WBC 23.8 06/20/2017 04:29 AM    HGB 8.9 06/20/2017 04:29 AM    PLATELET 292 55/55/7050 04:29 AM    MCV 86.8 06/20/2017 04:29 AM     Lab Results   Component Value Date/Time    AST (SGOT) 24 06/16/2017 04:41 AM    Alk.  phosphatase 183 06/16/2017 04:41 AM    Protein, total 7.6 06/16/2017 04:41 AM    Albumin 2.2 06/16/2017 04:41 AM    Globulin 5.4 06/16/2017 04:41 AM     No results found for: CPK, RCK1, RCK2, RCK3, RCK4, CKNDX, CKND1, TROPT, TROIQ, BNPP, BNP   Lab Results   Component Value Date/Time    Color DARK YELLOW 06/15/2017 09:06 AM    Appearance CLEAR 06/15/2017 09:06 AM    pH (UA) 5.0 06/15/2017 09:06 AM    Protein NEGATIVE  06/15/2017 09:06 AM    Glucose NEGATIVE  06/15/2017 09:06 AM    Ketone NEGATIVE  06/15/2017 09:06 AM    Blood NEGATIVE  06/15/2017 09:06 AM    Urobilinogen 2.0 06/15/2017 09:06 AM    Nitrites NEGATIVE  06/15/2017 09:06 AM    Leukocyte Esterase TRACE 06/15/2017 09:06 AM    WBC 0-4 06/15/2017 09:06 AM    RBC 0-5 06/15/2017 09:06 AM    Bacteria NEGATIVE  06/15/2017 09:06 AM     Lab Results   Component Value Date/Time    Culture result: NO GROWTH 2 DAYS 06/18/2017 11:40 AM    Culture result: NO GROWTH 6 DAYS 06/15/2017 05:21 AM    Culture result: NO GROWTH 6 DAYS 06/15/2017 05:21 AM       No results found for: TOXA1, RPR, HBCM, HBSAG, HAAB, HCAB1, HAAT, G6PD, CRYAC, HIVGT, HIVR, HIV1, HIV12, HIVPC, HIVRPI  No results found for: IRON, FE, TIBC, IBCT, PSAT, FERR  No results found for: SR, CRP, LAURA, ANAIGG, RA, RPR, RPRT, VDRLT, VDRLS, TSH, TSHEXT, TSHEXT    Imaging:  I have personally reviewed the patients radiographs and have reviewed the reports:          Results from Hospital Encounter encounter on 06/15/17   XR CHEST PORT   Narrative Clinical indication: Left pleural effusion, recent thoracentesis. AP portable erect view of the chest obtained after a left thoracentesis show  persistent fluid partially resolving loculated with some infiltrate at the left  lung base. The right lung is clear. There is no pneumothorax or shift. Impression impression: No acute findings post left thoracentesis. Results from Hospital Encounter encounter on 06/15/17   CTA CHEST W OR W WO CONT   Narrative EXAM:  CTA CHEST W OR W WO CONT    INDICATION:   Fever, Tachycardic, Hypoxic, PE?    COMPARISON: Radiographs same day. CONTRAST:  80 mL of Isovue-370. TECHNIQUE:   Precontrast  images were obtained to localize the volume for acquisition. Multislice helical CT arteriography was performed from the diaphragm to the  thoracic inlet during uneventful rapid bolus intravenous contrast  administration. Lung and soft tissue windows were generated. Coronal and  sagittal images were generated and 3D post processing consisting of coronal  maximum intensity images was performed. CT dose reduction was achieved through  use of a standardized protocol tailored for this examination and automatic  exposure control for dose modulation. FINDINGS:  There is a mild left pleural effusion. There is mild centrilobular emphysema. Patchy opacities are seen in the left base which may related to atelectasis  versus airspace disease. No evidence of a worrisome pulmonary nodule. The pulmonary arteries are well enhanced and no pulmonary emboli are identified. There is a 9 mm node in the AP window node is likely reactive. Additional  subcentimeter lymph nodes are scattered throughout the mediastinum and both  juan. No evidence of a mass. The aorta enhances normally without evidence of  aneurysm or dissection. There is a 10 x 15 mm cyst in the anterior left hepatic lobe. The remainder the  visualized subdiaphragmatic viscera appears unremarkable. Eula Lawrence Impression IMPRESSION:   1. Negative for pulmonary embolus. 2. Mild left pleural effusion with mild left basilar atelectasis or airspace  disease. My assessment/plan was discussed with:  nursing    respiratory therapy Dr. cooley      Complex decision making was performed which includes reviewing the patient's past medical records, current laboratory results, medications, ECG and actual Xray films, immediately available at the bedside to the patient    Thank you for allowing us to participate in the care of this patient. We will be happy to follow with you.     Hossein Sotomayor MD

## 2017-06-21 NOTE — PROGRESS NOTES
End of Shift Nursing Note    Bedside shift change report given to *** (oncoming nurse) by *** (offgoing nurse). Report included the following information {SBAR REPORTS MODJ:94706}. Zone Phone:   ***    Significant changes during shift:    ***   Non-emergent issues for physician to address:   ***     Number times ambulated in hallway past shift: {NUMBERS 0-5 [[id number]]}      Number of times OOB to chair past shift: {NUMBERS 0-5 [[id number]]}    POD #: ***     Vital Signs:    Temp: 98.8 °F (37.1 °C)     Pulse (Heart Rate): 70     BP: 126/68     Resp Rate: 16     O2 Sat (%): 97 %    Lines & Drains:     Urinary Catheter? {Urinary Catheter:18490}   Placement Date: ***   Medical Necessity: ***  Central Line? {Central IOBU:06692}   Placement Date: ***   Medical Necessity: ***  PICC Line? {PICC DOQW:15772}   Placement Date: ***   Medical Necessity: ***    NG tube [] in [] removed [] not applicable   Drains [] in [] removed [] not applicable     Skin Integrity:      Wounds: {YES/NO:19426::\"no\"}   Dressings Present: {YES/NO:19426::\"no\"}    Wound Concerns: {YES/NO:19426::\"no\"}      GI:    Current diet:  DIET REGULAR    Nausea: {YES/NO:19668}  Vomiting: {YES/NO:26413}  Bowel Sounds: {YES/NO:91095}  Flatus: {YES/NO:25031}  Last Bowel Movement: {Time; today/yest/etc:77273}   Appearance: ***    Respiratory:  Supplemental O2: {YES/NO:41125}      Device: ***   via *** Liters/min     Incentive Spirometer: {YES/NO:41996}  Volume: ***  Coughing and Deep Breathing: {YES/NO:86400}  Oral Care: {YES/NO:96825}  Understanding (patient/family education): {YES/NO:17492}   Getting out of bed: {YES/NO:24345}  Head of bed elevation: {YES/NO:98392}    Patient Safety:    Falls Score: {Numbers; 1-4 :[id number]}  Mobility Score: {Numbers; 1-4 :53262362}  Bed Alarm On? {YES/NO:39849}  Sitter? {YES/NO:09649}      Opportunity for questions and clarification was given to oncoming nurse.  Patient bed is in *** position, side rails are up x ***, door & observation blinds open as needed, call bell within reach and patient not in distress.     Kaz Ward

## 2017-06-21 NOTE — PROGRESS NOTES
End of Shift Nursing Note    Bedside shift change report given to Roseanna Cedillo (oncoming nurse) by Nikhil Biswas (offgoing nurse). Report included the following information SBAR, Kardex, Intake/Output, MAR and Recent Results. Zone Phone:   [phone]    Significant changes during shift:    0   Non-emergent issues for physician to address:   0     Number times ambulated in hallway past shift: 1    Number of times OOB to chair past shift: 1    POD #: 0     Vital Signs:    Temp: 98.5 °F (36.9 °C)     Pulse (Heart Rate): 80     BP: 117/67     Resp Rate: 14     O2 Sat (%): 98 %    Lines & Drains:     Urinary Catheter? No   Placement Date: 0   Medical Necessity: 0  Central Line? No   Placement Date: 0   Medical Necessity: 0  PICC Line? No   Placement Date: 0   Medical Necessity: 0    NG tube [] in [] removed [x] not applicable   Drains [] in [] removed [x] not applicable     Skin Integrity:      Wounds: no   Dressings Present: no    Wound Concerns: no      GI:    Current diet:  DIET REGULAR    Nausea: NO  Vomiting: NO  Bowel Sounds: YES  Flatus: YES  Last Bowel Movement: today   Appearance: 0    Respiratory:  Supplemental O2: No      Device: 0   via 0 Liters/min     Incentive Spirometer: YES  Volume: 0  Coughing and Deep Breathing: YES  Oral Care: YES  Understanding (patient/family education): YES   Getting out of bed: YES  Head of bed elevation: YES    Patient Safety:    Falls Score: 1  Mobility Score: 1  Bed Alarm On? No  Sitter? No      Opportunity for questions and clarification was given to oncoming nurse. Patient bed is in low position, side rails are up x 2, door & observation blinds open as needed, call bell within reach and patient not in distress.     Caro Lopez RN

## 2017-06-21 NOTE — PROGRESS NOTES
Nutrition Services      Nutrition Screen:  Wt Readings from Last 10 Encounters:   06/15/17 64.5 kg (142 lb 3.2 oz)     Body mass index is 21.62 kg/(m^2). Supplements: Ensure                        __x___ ordered ______  declined. __ __  Pt is nutritionally stable at this time, will rescreen in 7 days. __x__   Pt is at nutritional risk and will be rescreened in 3-5 days. __ __  Pt is at moderate or high nutritional risk, will refer to RD for assessment.        Margarita Chadwick  Dietetic Technician, Registered

## 2017-06-21 NOTE — PROGRESS NOTES
Hospitalist Progress Note    NAME: Darwin Wolfe   :  1965   MRN:  [id number]       Assessment / Plan:  Sepsis POA resolved  Leukocytosis POA- improved slightly but still high  Acute respiratory failure POA with hypoxia  Likely underlying Chronic resp failure /COPD/due to smoking  Pleuritic chest pain POA-  Due to Pneumonia (LLL) with L Pleural effusion/empyema- worsening on repeat CXR ; Thickened pleura along L latereal wall after tap on  on cxr  Lact normal  UA neg    S/p IV Rocephin & Azithro , changed to IV levaquin and unasyn added   F/u blood cultures - remains negative  pulm consulted  - appreciate input  S/p O2 and now on Room air  Consulted IR to tap the L pleural space  - WBC but no organisms seen  PRN nebs; serial cxr to look for any enlarging effsusion as if enlarges may need chest tube and lytic therapy  PRN Norco and s/p tordal for pain (since Friday so d/c'd)       Acute Renal Failure POA- resolved now, Cr 1.0  Hyponatremia POA- monitor  Related to dehydration  Off IVF now    D-dimer, elevated POA  CTA chest neg for PE, confirms LLL ASD + mild Pleural effusion       Code Status: Full  Surrogate Decision Maker: Wife     DVT Prophylaxis: Lovenox  GI Prophylaxis: not indicated     Baseline: functional      Body mass index is 21.62 kg/(m^2). Code status: Full  Recommended Disposition: Home w/Family     Subjective:     Chief Complaint / Reason for Physician Visit F/u Resp failure, PNA, Sepsis  Pain is worse in the AM and the at night. Discussed with RN events overnight.      Review of Systems:  Symptom Y/N Comments  Symptom Y/N Comments   Fever/Chills    Chest Pain y L sided on deep breath   Poor Appetite    Edema     Cough y   Abdominal Pain     Sputum n   Joint Pain     SOB/BERG     Pruritis/Rash     Nausea/vomit n   Tolerating PT/OT     Diarrhea n   Tolerating Diet y    Constipation    Other       Could NOT obtain due to:      Objective:     VITALS:   Last 24hrs VS reviewed since prior progress note. Most recent are:  Patient Vitals for the past 24 hrs:   Temp Pulse Resp BP SpO2   06/21/17 0758 98.1 °F (36.7 °C) 73 17 119/63 98 %   06/21/17 0450 97.8 °F (36.6 °C) 78 16 (!) 141/96 99 %   06/20/17 2030 98.8 °F (37.1 °C) 70 16 126/68 97 %   06/20/17 1555 99.1 °F (37.3 °C) 81 16 131/73 96 %       Intake/Output Summary (Last 24 hours) at 06/21/17 1000  Last data filed at 06/21/17 0559   Gross per 24 hour   Intake              800 ml   Output             1050 ml   Net             -250 ml        PHYSICAL EXAM:  General: WD, WN. Alert, cooperative, no acute distress    EENT:  EOMI. Anicteric sclerae. MMM  Resp:  Decreased BS in bases b/l. No accessory muscle use  CV:  Regular  rhythm,  No edema  GI:  Soft, Non distended, Non tender.  +Bowel sounds  Neurologic:  Alert and oriented X 3, normal speech,   Psych:   Good insight. Not anxious nor agitated  Skin:    No jaundice    Reviewed most current lab test results and cultures  YES  Reviewed most current radiology test results   YES  Review and summation of old records today    NO  Reviewed patient's current orders and MAR    YES  PMH/ reviewed - no change compared to H&P  ________________________________________________________________________  Care Plan discussed with:    Comments   Patient x    Family      RN x    Care Manager     Consultant                        Multidiciplinary team rounds were held today with , nursing, pharmacist and clinical coordinator. Patient's plan of care was discussed; medications were reviewed and discharge planning was addressed.      ________________________________________________________________________  Total NON critical care TIME:    Minutes    Total CRITICAL CARE TIME Spent:   Minutes non procedure based      Comments   >50% of visit spent in counseling and coordination of care     ________________________________________________________________________  Madison Rodriguez MD     Procedures: see electronic medical records for all procedures/Xrays and details which were not copied into this note but were reviewed prior to creation of Plan. LABS:  I reviewed today's most current labs and imaging studies.   Pertinent labs include:  Recent Labs      06/20/17 0429 06/19/17 0352   WBC  23.8*  26.6*   HGB  8.9*  9.3*   HCT  26.3*  26.5*   PLT  582*  499*     Recent Labs      06/20/17 0429 06/19/17 0352   NA  135*  134*   K  3.7  3.6   CL  99  101   CO2  27  26   GLU  95  100   BUN  10  9   CREA  0.63*  0.62*   CA  9.0  8.6       Signed: Tarik Monroe MD

## 2017-06-21 NOTE — PROGRESS NOTES
Problem: Mobility Impaired (Adult and Pediatric)  Goal: *Acute Goals and Plan of Care (Insert Text)  Physical Therapy Goals  Initiated 6/21/2017  1. Patient will move from supine to sit and sit to supine , scoot up and down and roll side to side in bed with independence within 7 day(s). 2. Patient will transfer from bed to chair and chair to bed with independence using the least restrictive device within 7 day(s). 3. Patient will perform sit to stand with independence within 7 day(s). 4. Patient will ambulate with independence for 300 feet with the least restrictive device within 7 day(s). 5. Patient will ascend/descend 8 stairs with 2 handrail(s) with independence within 7 day(s). 6. Patient will improve Lares Balance score by 7 points within 7 days. PHYSICAL THERAPY EVALUATION  Patient: Boy Tinajero (57 y.o. male)  Date: 6/21/2017  Primary Diagnosis: Sepsis ([address]        Precautions: falls        ASSESSMENT :  Based on the objective data described below, the patient presents with impaired standing balance and endurance following prolonged hospital stay secondary to sepsis. Patient demonstrating independence with bed mobility and transfers but requires CGA for ambulation. Gait is mildly unsteady demonstrating path deviations to R with several balance checks in which patient was able to independently regain balance. Patient scored a 46/56 on the Lares Balance test which indicates lower risk of falls. 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 Currently patient is below his functional baseline and would benefit from further PT during hospitalization. Patient also encouraged to ambulate in halls several times/day with nursing staff. Do not anticipate any further PT needs following discharge but patient would benefit from a generalized exercise program to improver overall endurance.      Patient will benefit from skilled intervention to address the above impairments. Patients rehabilitation potential is considered to be Good  Factors which may influence rehabilitation potential include:   [ ]         None noted  [ ]         Mental ability/status  [ ]         Medical condition  [ ]         Home/family situation and support systems  [ ]         Safety awareness  [ ]         Pain tolerance/management  [ ]         Other:        PLAN :  Recommendations and Planned Interventions:  [ ]           Bed Mobility Training             [ ]    Neuromuscular Re-Education  [ ]           Transfer Training                   [ ]    Orthotic/Prosthetic Training  [ ]           Gait Training                         [ ]    Modalities  [ ]           Therapeutic Exercises           [ ]    Edema Management/Control  [ ]           Therapeutic Activities            [ ]    Patient and Family Training/Education  [ ]           Other (comment):     Frequency/Duration: Patient will be followed by physical therapy  3 times a week to address goals. Discharge Recommendations: None anticipated          SUBJECTIVE:   Patient stated A man mu age should not be feeling this bad.       OBJECTIVE DATA SUMMARY:   HISTORY:    No past medical history on file. No past surgical history on file.   Prior Level of Function/Home Situation: patient reports complete independence with all mobility and ADLs; working full-time        Home Situation  Home Environment: Private residence  # Steps to Enter: (P) 8  Rails to Enter: (P) Yes  Hand Rails : (P) Bilateral  One/Two Story Residence: One story  Living Alone: Yes  Support Systems: Family member(s), Friends \ neighbors  Patient Expects to be Discharged to[de-identified] Private residence  Current DME Used/Available at Home: None  Tub or Shower Type: (P) Tub/Shower combination     EXAMINATION/PRESENTATION/DECISION MAKING:   Critical Behavior:  Neurologic State: Alert, Appropriate for age  Orientation Level: Appropriate for age, Oriented X4  Cognition: Appropriate decision making, Appropriate for age attention/concentration, Follows commands     Hearing: Auditory  Auditory Impairment: None     Range Of Motion:  AROM: Within functional limits                       Strength:    Strength: Within functional limits                    Tone & Sensation:   Tone: Normal                              Coordination:  Coordination: Within functional limits  Vision:      Functional Mobility:  Bed Mobility:  Rolling: Independent  Supine to Sit: Independent     Scooting: Independent  Transfers:  Sit to Stand: Independent  Stand to Sit: Independent        Bed to Chair: Contact guard assistance              Balance:   Sitting: Intact  Standing: Impaired  Standing - Static: Good; Unsupported  Standing - Dynamic : Fair  Ambulation/Gait Training:  Distance (ft): 250 Feet (ft)  Assistive Device: Gait belt  Ambulation - Level of Assistance: Contact guard assistance        Gait Abnormalities: Path deviations (to R with several balance checks noted)        Base of Support: Narrowed     Speed/Isabel: Pace decreased (<100 feet/min)  Step Length: Left shortened;Right shortened (equal)      gait is slow and mildly unsteady demonstrating path deviation to R and several balance check to r           Functional Measure:  Lares Balance Test:      Sitting to Standing: 3  Standing Unsupported: 4  Sitting with Back Unsupported: 4  Standing to Sittin  Transfers: 4  Standing Unsupported with Eyes Closed: 4  Standing Unsupported with Feet Together: 4  Reach Forward with Outstretched Arm: 4   Object: 3  Turn to Look Over Shoulders: 4  Turn 360 Degrees: 3  Alternate Foot on Step/Stool: 3  Standing Unsupported One Foot in Front: 1  Stand on One Le  Total: 46             56=Maximum possible score;   0-20=High fall risk  21-40=Moderate fall risk   41-56=Low fall risk      Lares Balance Test and G-code impairment scale:  Percentage of Impairment CH     0%    CI     1-19% CJ     20-39% CK     40-59% CL 60-79% CM     80-99% CN      100%   Lares   Score 0-56 56 45-55 34-44 23-33 12-22 1-11 0            G codes: In compliance with CMSs Claims Based Outcome Reporting, the following G-code set was chosen for this patient based on their primary functional limitation being treated: The outcome measure chosen to determine the severity of the functional limitation was the [facility] test with a score of 46/56 which was correlated with the impairment scale. · Mobility - Walking and Moving Around:               - CURRENT STATUS:    CI - 1%-19% impaired, limited or restricted               - GOAL STATUS:           CH - 0% impaired, limited or restricted               - D/C STATUS:                       ---------------To be determined---------------            Pain:  Pain Scale 1: Numeric (0 - 10)  Pain Intensity 1: 0  Pain Location 1: Back  Pain Orientation 1: Lower  Pain Description 1: Aching  Pain Intervention(s) 1:  (walking with PT)  Activity Tolerance:   O2 sats remained 95-97% on RA throughout tx session  Please refer to the flowsheet for vital signs taken during this treatment. After treatment:   [X]         Patient left in no apparent distress sitting up in chair  [ ]         Patient left in no apparent distress in bed  [X]         Call bell left within reach  [X]         Nursing notified  [ ]         Caregiver present  [ ]         Bed alarm activated      COMMUNICATION/EDUCATION:   The patients plan of care was discussed with: Registered Nurse and Rehabilitation Attendant. [X]         Fall prevention education was provided and the patient/caregiver indicated understanding. [X]         Patient/family have participated as able in goal setting and plan of care. [X]         Patient/family agree to work toward stated goals and plan of care. [ ]         Patient understands intent and goals of therapy, but is neutral about his/her participation.   [ ]         Patient is unable to participate in goal setting and plan of care.      Thank you for this referral.  Rexann Felty, PT   Time Calculation: 36 mins

## 2017-06-22 ENCOUNTER — APPOINTMENT (OUTPATIENT)
Dept: GENERAL RADIOLOGY | Age: 52
DRG: 871 | End: 2017-06-22
Attending: INTERNAL MEDICINE
Payer: SUBSIDIZED

## 2017-06-22 VITALS
RESPIRATION RATE: 16 BRPM | WEIGHT: 142.2 LBS | HEIGHT: 68 IN | BODY MASS INDEX: 21.55 KG/M2 | SYSTOLIC BLOOD PRESSURE: 119 MMHG | OXYGEN SATURATION: 95 % | TEMPERATURE: 97.5 F | HEART RATE: 74 BPM | DIASTOLIC BLOOD PRESSURE: 72 MMHG

## 2017-06-22 LAB
ERYTHROCYTE [DISTWIDTH] IN BLOOD BY AUTOMATED COUNT: 14.3 % (ref 11.5–14.5)
HCT VFR BLD AUTO: 24.5 % (ref 36.6–50.3)
HGB BLD-MCNC: 8.3 G/DL (ref 12.1–17)
MCH RBC QN AUTO: 29.6 PG (ref 26–34)
MCHC RBC AUTO-ENTMCNC: 33.9 G/DL (ref 30–36.5)
MCV RBC AUTO: 87.5 FL (ref 80–99)
PLATELET # BLD AUTO: 741 K/UL (ref 150–400)
RBC # BLD AUTO: 2.8 M/UL (ref 4.1–5.7)
WBC # BLD AUTO: 21.2 K/UL (ref 4.1–11.1)

## 2017-06-22 PROCEDURE — 71010 XR CHEST PORT: CPT

## 2017-06-22 PROCEDURE — 74011250636 HC RX REV CODE- 250/636: Performed by: INTERNAL MEDICINE

## 2017-06-22 PROCEDURE — 74011250637 HC RX REV CODE- 250/637: Performed by: INTERNAL MEDICINE

## 2017-06-22 PROCEDURE — 85027 COMPLETE CBC AUTOMATED: CPT | Performed by: INTERNAL MEDICINE

## 2017-06-22 PROCEDURE — 74011000258 HC RX REV CODE- 258: Performed by: INTERNAL MEDICINE

## 2017-06-22 PROCEDURE — 36415 COLL VENOUS BLD VENIPUNCTURE: CPT | Performed by: INTERNAL MEDICINE

## 2017-06-22 RX ORDER — GUAIFENESIN 600 MG/1
600 TABLET, EXTENDED RELEASE ORAL EVERY 12 HOURS
Qty: 14 TAB | Refills: 0 | Status: SHIPPED | OUTPATIENT
Start: 2017-06-22 | End: 2017-06-29

## 2017-06-22 RX ORDER — HYDROCODONE BITARTRATE AND ACETAMINOPHEN 5; 325 MG/1; MG/1
1-2 TABLET ORAL
Qty: 10 TAB | Refills: 0 | Status: SHIPPED | OUTPATIENT
Start: 2017-06-22 | End: 2017-07-31

## 2017-06-22 RX ORDER — LEVOFLOXACIN 750 MG/1
750 TABLET ORAL DAILY
Qty: 10 TAB | Refills: 0 | Status: SHIPPED | OUTPATIENT
Start: 2017-06-22 | End: 2017-07-31

## 2017-06-22 RX ORDER — METRONIDAZOLE 500 MG/1
500 TABLET ORAL 3 TIMES DAILY
Qty: 30 TAB | Refills: 0 | Status: SHIPPED | OUTPATIENT
Start: 2017-06-22 | End: 2017-07-02

## 2017-06-22 RX ADMIN — HYDROCODONE BITARTRATE AND ACETAMINOPHEN 2 TABLET: 5; 325 TABLET ORAL at 08:59

## 2017-06-22 RX ADMIN — SODIUM CHLORIDE 3 G: 900 INJECTION, SOLUTION INTRAVENOUS at 06:03

## 2017-06-22 RX ADMIN — LEVOFLOXACIN 750 MG: 5 INJECTION, SOLUTION INTRAVENOUS at 08:59

## 2017-06-22 RX ADMIN — HYDROCODONE BITARTRATE AND ACETAMINOPHEN 2 TABLET: 5; 325 TABLET ORAL at 03:30

## 2017-06-22 RX ADMIN — GUAIFENESIN 600 MG: 600 TABLET, EXTENDED RELEASE ORAL at 08:59

## 2017-06-22 RX ADMIN — SODIUM CHLORIDE 3 G: 900 INJECTION, SOLUTION INTRAVENOUS at 00:13

## 2017-06-22 NOTE — PROGRESS NOTES
[facility] Consult Service Note  Pulmonary, Critical Care, and Sleep Medicine    Name: Jonathan Lee MRN: [id number]   : 1965 Hospital: [facility]   Date: 2017   Hospital Day: 8       Subjective/Interval History:   I have reviewed the flowsheet and previous days notes. Seen earlier today on rounds.  off O2. Chest pain lingering but using incentive spirometer. Explained rationale of the device   back sore. Stomach upset, loose stools. No fever. No SOB. WBC still up    IMPRESSION:   · Acute severe CAP CTA chest neg for PE, confirms LLL ASD + mild Pleural effusion  · Complicated left pleural effusion with empyema and loculation that worsened quickly while on IV bax from Ct chest 6/15 to ultrasound guided thoracentesis ; 30 ml cloudy yellow fluid removed; CXR post tap continues to have thickened left pleura along lateral wall  · Sepsis POA  · Leukocytosis POA- still high  · Acute respiratory failure POA with hypoxia almost off O2  · Pleuritic chest pain POA- persistent but slightly better after fluid removed  · Cigarette smoker in remission x 6 months  · Acute Renal Failure POA- resolved now, Cr 1.0  · Hyponatremia POA- Related to dehydration  · D-dimer, elevated POA      RECOMMENDATIONS/PLAN:   · Will need transition to oral agents to cover most common gram positive, gram negative, anaerobes- augmentin? Or levaquin and flagyl  · Once Gi issues resolved, can discharge on PO abx for 10 days. Code: Full Code      Risk of deterioration: low   [x] High complexity decision making was performed    Subjective/Initial History:   I have reviewed the flowsheet and previous days notes. I was asked by Nito Mathews MD to see Jonathan Lee in consultation for a chief complaint of . Jonathan Lee is a 47 y.o.  male who presented 6/15 with pleuritic chest pain. As per patient, he had been feeling sick for past 2 weeks.  He claimed to have started with cough which was productive with brown sputum. He reported 8/10 pain upon deep breathing, hurting and lower chest, non radiating, sharp to aching in nature. Pt had fevers, chills, sweats at home. Pt denied any nausea, vomiting, diarrhea, abdominal pain. In ED pt noted to have LLL PNA on CXR, fever, tachycardia and leukocytosis. Chest Ct confirmed pneumonia and small effusion on left. Pt admitted and placed on IV Azithromycin and Ceftriaxone. CXR worsened and he had thoracentesis 6/19 and had 30 ml removed. No hemoptysis. His left chest pain improved some and has almost weaned off O2. No fever. No chills. Denied chest trauma. No PCP. Never had pneumonia vaccines. Quit smoking months ago when he noticed some breathing limitations. Claims to have felt this way years ago but was not diagnosed with pneumonia      No Known Allergies     MAR reviewed and pertinent medications noted or modified as needed     Current Facility-Administered Medications   Medication    lactobac ac& pc-s.therm-b.anim (NORBERTO Q/RISAQUAD)    HYDROcodone-acetaminophen (NORCO) 5-325 mg per tablet 1-2 Tab    ampicillin-sulbactam (UNASYN) 3 g in 0.9% sodium chloride (MBP/ADV) 100 mL    levoFLOXacin (LEVAQUIN) 750 mg in D5W IVPB    albuterol-ipratropium (DUO-NEB) 2.5 MG-0.5 MG/3 ML    sodium chloride (NS) flush 5-10 mL    sodium chloride (NS) flush 5-10 mL    acetaminophen (TYLENOL) tablet 650 mg    ondansetron (ZOFRAN) injection 4 mg    enoxaparin (LOVENOX) injection 40 mg    guaiFENesin-dextromethorphan (ROBITUSSIN DM) 100-10 mg/5 mL syrup 10 mL    guaiFENesin ER (MUCINEX) tablet 600 mg        PMH:  has no past medical history on file. PSH:   has no past surgical history on file. FHX: family history is not on file. SHX:       ROS:A comprehensive review of systems was negative except for that written in the HPI.     Objective:     Vital Signs: Intake/Output: Intake/Output:   Visit Vitals    /72 (BP 1 Location: Right arm)    Pulse 74    Temp 97.5 °F (36.4 °C)    Resp 16    Ht 5' 8\" (1.727 m)    Wt 64.5 kg (142 lb 3.2 oz)    SpO2 95%    BMI 21.62 kg/m2      O2 Device: Room air  O2 Flow Rate (L/min): 2 l/min  Temp (24hrs), Av °F (36.7 °C), Min:97.5 °F (36.4 °C), Max:98.5 °F (36.9 °C)     Intake/Output Summary (Last 24 hours) at 17 0946  Last data filed at 17 0745   Gross per 24 hour   Intake              700 ml   Output              250 ml   Net              450 ml    Last shift:       07 -  1900  In: 100 [P.O.:100]  Out: 250 [Urine:250]  Last 3 shifts: 1901 -  0700  In: 1040 [P.O.:1040]  Out: 600 [Urine:600]           Physical Exam:    General: lean AAM in no respiratory distress and acyanotic, alert, oriented times 3 and moderately ill   HEENT: NCAT, fair dentition with worn teeth and partially edentulous; No Thrush; class _ airway   Neck: No abnormally enlarged lymph nodes. Chest: normal   Lungs: normal air entry   Heart: Regular rate and rhythm   Abdomen: protuberant,no rebound   :    Extremity: negative, cyanosis, clubbing    Neuro: alert   Psych: oriented to time, place and person   Skin: Skin unremarkable;    Pulses:Bilateral, Radial, 2+   Capillary refill: normal warm,    Data:                 Labs:  Recent Labs      17   WBC  21.2*  23.8*   HGB  8.3*  8.9*   HCT  24.5*  26.3*   PLT  741*  582*     Recent Labs      17   NA  135*   K  3.7   CL  99   CO2  27   GLU  95   BUN  10   CREA  0.63*   CA  9.0     No results for input(s): PH, PCO2, PO2, HCO3, FIO2 in the last 72 hours.     Lab Results   Component Value Date/Time    Sodium 135 2017 04:29 AM    Potassium 3.7 2017 04:29 AM    Chloride 99 2017 04:29 AM    CO2 27 2017 04:29 AM    BUN 10 2017 04:29 AM    Creatinine 0.63 2017 04:29 AM    Glucose 95 2017 04:29 AM    Calcium 9.0 2017 04:29 AM    Lactic acid 1.1 06/15/2017 05:21 AM Lab Results   Component Value Date/Time    WBC 21.2 06/22/2017 04:18 AM    HGB 8.3 06/22/2017 04:18 AM    PLATELET 482 23/54/1763 04:18 AM    MCV 87.5 06/22/2017 04:18 AM     Lab Results   Component Value Date/Time    AST (SGOT) 24 06/16/2017 04:41 AM    Alk. phosphatase 183 06/16/2017 04:41 AM    Protein, total 7.6 06/16/2017 04:41 AM    Albumin 2.2 06/16/2017 04:41 AM    Globulin 5.4 06/16/2017 04:41 AM     No results found for: CPK, RCK1, RCK2, RCK3, RCK4, CKNDX, CKND1, TROPT, TROIQ, BNPP, BNP   Lab Results   Component Value Date/Time    Color DARK YELLOW 06/15/2017 09:06 AM    Appearance CLEAR 06/15/2017 09:06 AM    pH (UA) 5.0 06/15/2017 09:06 AM    Protein NEGATIVE  06/15/2017 09:06 AM    Glucose NEGATIVE  06/15/2017 09:06 AM    Ketone NEGATIVE  06/15/2017 09:06 AM    Blood NEGATIVE  06/15/2017 09:06 AM    Urobilinogen 2.0 06/15/2017 09:06 AM    Nitrites NEGATIVE  06/15/2017 09:06 AM    Leukocyte Esterase TRACE 06/15/2017 09:06 AM    WBC 0-4 06/15/2017 09:06 AM    RBC 0-5 06/15/2017 09:06 AM    Bacteria NEGATIVE  06/15/2017 09:06 AM     Lab Results   Component Value Date/Time    Culture result: NO GROWTH 2 DAYS 06/18/2017 11:40 AM    Culture result: NO GROWTH 6 DAYS 06/15/2017 05:21 AM    Culture result: NO GROWTH 6 DAYS 06/15/2017 05:21 AM       No results found for: TOXA1, RPR, HBCM, HBSAG, HAAB, HCAB1, HAAT, G6PD, CRYAC, HIVGT, HIVR, HIV1, HIV12, HIVPC, HIVRPI  No results found for: IRON, FE, TIBC, IBCT, PSAT, FERR  No results found for: SR, CRP, LAURA, ANAIGG, RA, RPR, RPRT, VDRLT, VDRLS, TSH, TSHEXT, TSHEXT    Imaging:  I have personally reviewed the patients radiographs and have reviewed the reports:          Results from Hospital Encounter encounter on 06/15/17   XR CHEST PORT   Narrative EXAM:  XR CHEST PORT    INDICATION:  left empyema    COMPARISON:  June 19    FINDINGS: A portable AP radiograph of the chest was obtained at 0400 hours. The  patient is on a cardiac monitor.   There is patchy airspace opacification in the  left lower lobe. There is pleural thickening in the left hemithorax. The  cardiac and mediastinal contours and pulmonary vascularity are normal.  The  bones and soft tissues are grossly within normal limits. Impression IMPRESSION: Patchy airspace opacification left lower lobe. Results from Hospital Encounter encounter on 06/15/17   CTA CHEST W OR W WO CONT   Narrative EXAM:  CTA CHEST W OR W WO CONT    INDICATION:   Fever, Tachycardic, Hypoxic, PE?    COMPARISON: Radiographs same day. CONTRAST:  80 mL of Isovue-370. TECHNIQUE:   Precontrast  images were obtained to localize the volume for acquisition. Multislice helical CT arteriography was performed from the diaphragm to the  thoracic inlet during uneventful rapid bolus intravenous contrast  administration. Lung and soft tissue windows were generated. Coronal and  sagittal images were generated and 3D post processing consisting of coronal  maximum intensity images was performed. CT dose reduction was achieved through  use of a standardized protocol tailored for this examination and automatic  exposure control for dose modulation. FINDINGS:  There is a mild left pleural effusion. There is mild centrilobular emphysema. Patchy opacities are seen in the left base which may related to atelectasis  versus airspace disease. No evidence of a worrisome pulmonary nodule. The pulmonary arteries are well enhanced and no pulmonary emboli are identified. There is a 9 mm node in the AP window node is likely reactive. Additional  subcentimeter lymph nodes are scattered throughout the mediastinum and both  juan. No evidence of a mass. The aorta enhances normally without evidence of  aneurysm or dissection. There is a 10 x 15 mm cyst in the anterior left hepatic lobe. The remainder the  visualized subdiaphragmatic viscera appears unremarkable. .         Impression IMPRESSION:   1.  Negative for pulmonary embolus. 2. Mild left pleural effusion with mild left basilar atelectasis or airspace  disease.                  My assessment/plan was discussed with:  nursing    respiratory therapy Dr. family Abdon Maier MD

## 2017-06-22 NOTE — PROGRESS NOTES
Pt request to be discharged. MD examined pt and discharged with medications. Pt IV removed by PCT-cath tip intact. Pt declined care management to make follow up appointment. Gladis, the care manager, provided pt with information regarding MD's in the [place] area. Pt stated, \"I will make the appointment myself later. \" Pt provided with discharge instructions and prescriptions and was instructed to take to personal pharmacy to be filled. Pt had no further questions for MD or RN. Volunteer took pt by wheelchair to front entrance to meet family member.

## 2017-06-22 NOTE — PROGRESS NOTES
2300- Bedside shift change report given to Aure Mike rn (oncoming nurse) by Analisa Miranda (offgoing nurse). Report included the following information SBAR, Kardex, Procedure Summary, Intake/Output, MAR, Recent Results and Cardiac Rhythm NSR.

## 2017-06-22 NOTE — DISCHARGE INSTRUCTIONS
HOSPITALIST DISCHARGE INSTRUCTIONS    NAME: Meseret Kohler   :  1965   MRN:  [id number]     Date/Time:  2017 11:30 AM    ADMIT DATE: 6/15/2017   DISCHARGE DATE: 2017     Attending Physician: Madyson Shaikh MD    DISCHARGE DIAGNOSIS:  PNA community acquired  Empyema L with pleural effusion  S/p sepsis  S/p acute resp failure  Pleuritic chest pain  S/p acute renal failure    MEDICATIONS:  See above    · It is important that you take the medication exactly as they are prescribed. · Keep your medication in the bottles provided by the pharmacist and keep a list of the medication names, dosages, and times to be taken in your wallet. · Do not take other medications without consulting your doctor. Pain Management: per above medications    What to do at Home    Recommended diet:  Regular Diet    Recommended activity: Activity as tolerated    Follow Up: Follow-up Information     None      you need to follow up with a PCP in 7-10 days. Please choose one. If you have questions regarding the hospital related prescriptions or hospital related issues please call [facility] at . You can always direct your questions to your primary care doctor if you are unable to reach your hospital physician; your PCP works as an extension of your hospital doctor just like your hospital doctor is an extension of your PCP for your time at [facility]. If you experience any of the following symptoms then please call your primary care physician or return to the emergency room if you cannot get hold of your doctor:  Fever, chills, nausea, vomiting, diarrhea, change in mentation, falling, bleeding, shortness of breath    Additional Instructions:      Bring these papers with you to your follow up appointments.  The papers will help your doctors be sure to continue the care plan from the hospital.              Information obtained by :  I understand that if any problems occur once I am at home I am to contact my physician. I understand and acknowledge receipt of the instructions indicated above.                                                                                                                                            Physician's or R.N.'s Signature                                                                  Date/Time                                                                                                                                              Patient or Representative Signature                                                          Da

## 2017-06-22 NOTE — PROGRESS NOTES
*CM acknowleged consult*. CM complete room d/c and observed pt removing medical equipment. Pt reported that he does not have PCP, as CM provided him list at admission. Pt refuse to give PCP for follow up appointment. Pt reported that his transportation is outside, and he will make appointment himself. Care Management Interventions  PCP Verified by CM: Yes  Mode of Transport at Discharge: Other (see comment)  Transition of Care Consult (CM Consult): Discharge Planning  Discharge Durable Medical Equipment: No  Physical Therapy Consult: No  Occupational Therapy Consult: No  Speech Therapy Consult: No  Current Support Network:  Other, Own Home  Confirm Follow Up Transport: Family  Plan discussed with Pt/Family/Caregiver: Yes  Discharge Location  Discharge Placement: [facility]/ Daniele [address]lee [address], MSW   [phone]

## 2017-06-22 NOTE — DISCHARGE SUMMARY
Hospitalist Discharge Summary     Patient ID:  Desiree Diaz  [id number]  47 y.o.  1965    PCP on record: None    Admit date: 6/15/2017  Discharge date and time: 6/22/2017      DISCHARGE DIAGNOSIS:  PNA community acquired  Empyema L with pleural effusion  S/p sepsis  S/p acute resp failure  Pleuritic chest pain  S/p acute renal failure      CONSULTATIONS:  IP CONSULT TO INTENSIVIST    Excerpted HPI from H&P of Noemi Madrid MD:   Desiree Diaz is a 47 y.o.  male who presents with pleuritic chest pain. As per patient, he is been feeling sick for past 2 weeks. He claims to have started with cough which is productive with brown sputum. He also reports 8/10 pain upon deep breathing, hurting and lower chest, non radiating, sharp to aching in nature. Pt also reports subjective fevers, chills, sweats at home. Pt denies any nausea, vomiting, diarrhea, abdominal pain. In ED pt noted to have LLL PNA on CXR, fever, tachycardia and leukocytosis.        ______________________________________________________________________  DISCHARGE SUMMARY/HOSPITAL COURSE:  for full details see H&P, daily progress notes, labs, consult notes. Sepsis POA resolved  Leukocytosis POA- improved With abx and tap  Acute respiratory failure POA with hypoxia  Likely underlying Chronic resp failure /COPD/due to smoking  Pleuritic chest pain POA-  Due to Pneumonia (LLL) with L Pleural effusion/empyema- worsening on repeat CXR 6/18;   Thickened pleura along L latereal wall after tap on 6/19 on cxr  Lact normal  UA neg     S/p IV Rocephin & Azithro , changed to IV levaquin and unasyn added 6/20  F/u blood cultures - remains negative  pulm consulted 6/20 - they added unasyn  S/p O2 and now on Room air  Consulted IR to tap the L pleural space  - WBC but no organisms seen  PRN nebs; serial cxr to look for any enlarging effsusion as if enlarges may need chest tube and lytic therapy - one on day of dc clear  PRN [place] and s/p tordal for pain (since Friday so d/c'd)        Acute Renal Failure POA- resolved now, Cr 1.0  Hyponatremia POA- monitor  Related to dehydration  Off IVF now     D-dimer, elevated POA  CTA chest neg for PE, confirms LLL ASD + mild Pleural effusion           _______________________________________________________________________  Patient seen and examined by me on discharge day. Pertinent Findings:  Gen:    Not in distress  Chest: Clear lungs  CVS:   Regular rhythm. No edema     _______________________________________________________________________  DISCHARGE MEDICATIONS:   Current Discharge Medication List      START taking these medications    Details   guaiFENesin ER (MUCINEX) 600 mg ER tablet Take 1 Tab by mouth every twelve (12) hours for 7 days. For cough; do not have finish this course  Qty: 14 Tab, Refills: 0      levoFLOXacin (LEVAQUIN) 750 mg tablet Take 1 Tab by mouth daily. Qty: 10 Tab, Refills: 0      metroNIDAZOLE (FLAGYL) 500 mg tablet Take 1 Tab by mouth three (3) times daily for 10 days. Qty: 30 Tab, Refills: 0      L. acidoph & paracasei- S therm- Bifido (NORBERTO-Q/RISAQUAD) 8 billion cell cap cap Take 1 Cap by mouth daily. Qty: 10 Cap, Refills: 0      HYDROcodone-acetaminophen (NORCO) 5-325 mg per tablet Take 1-2 Tabs by mouth every four (4) hours as needed. Max Daily Amount: 12 Tabs. Qty: 10 Tab, Refills: 0             My Recommended Diet, Activity, Wound Care, and follow-up labs are listed in the patient's Discharge Insturctions which I have personally completed and reviewed.     _______________________________________________________________________  DISPOSITION:    Home with Family: x   Home with HH/PT/OT/RN:    SNF/LTC:    RHIANNA:    OTHER:        Condition at Discharge:  Stable  _______________________________________________________________________  Follow up with:   PCP : None -CM to help with choice        Total time in minutes spent coordinating this discharge (includes going over instructions, follow-up, prescriptions, and preparing report for sign off to her PCP) :  32 minutes    Signed:  Norma Vaz MD

## 2017-06-22 NOTE — PROGRESS NOTES
Pt rang call bell stating \"I'm ready to go, I feel better. Call the doctor and tell her I'm leaving and I'm feeling better. \" This morning pt was having back and abdominal pain and stated \"I feel so bad today. \" MD paged to inform of pt's request to go home.

## 2017-06-22 NOTE — PROGRESS NOTES
End of Shift Nursing Note    Bedside shift change report given to [address] [place] [address] (oncoming nurse) by Shaun Kendrick RN (offgoing nurse). Report included the following information SBAR, Kardex and Recent Results. Zone Phone:   [phone]    Significant changes during shift:    0   Non-emergent issues for physician to address:   0     Number times ambulated in hallway past shift: 0      Number of times OOB to chair past shift: 2    POD #: 0     Vital Signs:    Temp: 98 °F (36.7 °C)     Pulse (Heart Rate): 75     BP: 123/73     Resp Rate: 16     O2 Sat (%): 95 %    Lines & Drains:     Urinary Catheter? Yes   Placement Date: 0   Medical Necessity: 0  Central Line? No   Placement Date: 0   Medical Necessity: 0  PICC Line? No   Placement Date: 0   Medical Necessity: 0    NG tube [] in [] removed [] not applicable   Drains [] in [] removed [] not applicable     Skin Integrity:      Wounds: yes   Dressings Present: yes    Wound Concerns: no      GI:    Current diet:  DIET REGULAR  DIET NUTRITIONAL SUPPLEMENTS Breakfast, Dinner; Ensure Complete    Nausea: NO  Vomiting: NO  Bowel Sounds: YES  Flatus: YES  Last Bowel Movement: yesterday   Appearance: 0    Respiratory:  Supplemental O2: No      Device: 0   via 0 Liters/min     Incentive Spirometer: YES  Volume: 1000  Coughing and Deep Breathing: YES  Oral Care: YES  Understanding (patient/family education): YES   Getting out of bed: YES  Head of bed elevation: YES    Patient Safety:    Falls Score: 1  Mobility Score: 1  Bed Alarm On? No  Sitter? No      Opportunity for questions and clarification was given to oncoming nurse. Patient bed is in low position, side rails are up x 2, door & observation blinds open as needed, call bell within reach and patient not in distress.     Kaden Bernal RN

## 2017-06-23 LAB
BACTERIA SPEC CULT: NORMAL
GRAM STN SPEC: NORMAL
SERVICE CMNT-IMP: NORMAL

## 2017-07-31 ENCOUNTER — APPOINTMENT (OUTPATIENT)
Dept: GENERAL RADIOLOGY | Age: 52
End: 2017-07-31
Attending: EMERGENCY MEDICINE
Payer: SELF-PAY

## 2017-07-31 ENCOUNTER — HOSPITAL ENCOUNTER (EMERGENCY)
Age: 52
Discharge: HOME | End: 2017-07-31
Attending: EMERGENCY MEDICINE
Payer: SELF-PAY

## 2017-07-31 VITALS
BODY MASS INDEX: 22.21 KG/M2 | WEIGHT: 141.54 LBS | HEIGHT: 67 IN | TEMPERATURE: 98.1 F | DIASTOLIC BLOOD PRESSURE: 65 MMHG | RESPIRATION RATE: 16 BRPM | SYSTOLIC BLOOD PRESSURE: 127 MMHG | OXYGEN SATURATION: 100 % | HEART RATE: 82 BPM

## 2017-07-31 DIAGNOSIS — J18.9 PNEUMONIA OF LEFT LOWER LOBE DUE TO INFECTIOUS ORGANISM: Primary | ICD-10-CM

## 2017-07-31 LAB
ALBUMIN SERPL BCP-MCNC: 3.8 G/DL (ref 3.5–5)
ALBUMIN/GLOB SERPL: 0.8 {RATIO} (ref 1.1–2.2)
ALP SERPL-CCNC: 77 U/L (ref 45–117)
ALT SERPL-CCNC: 25 U/L (ref 12–78)
ANION GAP BLD CALC-SCNC: 4 MMOL/L (ref 5–15)
AST SERPL W P-5'-P-CCNC: 17 U/L (ref 15–37)
ATRIAL RATE: 76 BPM
BASOPHILS # BLD AUTO: 0.1 K/UL (ref 0–0.1)
BASOPHILS # BLD: 1 % (ref 0–1)
BILIRUB SERPL-MCNC: 0.5 MG/DL (ref 0.2–1)
BUN SERPL-MCNC: 18 MG/DL (ref 6–20)
BUN/CREAT SERPL: 21 (ref 12–20)
CALCIUM SERPL-MCNC: 9.2 MG/DL (ref 8.5–10.1)
CALCULATED P AXIS, ECG09: 62 DEGREES
CALCULATED R AXIS, ECG10: 56 DEGREES
CALCULATED T AXIS, ECG11: 44 DEGREES
CHLORIDE SERPL-SCNC: 103 MMOL/L (ref 97–108)
CK SERPL-CCNC: 73 U/L (ref 39–308)
CO2 SERPL-SCNC: 28 MMOL/L (ref 21–32)
CREAT SERPL-MCNC: 0.84 MG/DL (ref 0.7–1.3)
D DIMER PPP FEU-MCNC: 0.62 MG/L FEU (ref 0–0.65)
DIAGNOSIS, 93000: NORMAL
EOSINOPHIL # BLD: 0.5 K/UL (ref 0–0.4)
EOSINOPHIL NFR BLD: 4 % (ref 0–7)
ERYTHROCYTE [DISTWIDTH] IN BLOOD BY AUTOMATED COUNT: 14.7 % (ref 11.5–14.5)
GLOBULIN SER CALC-MCNC: 4.8 G/DL (ref 2–4)
GLUCOSE SERPL-MCNC: 80 MG/DL (ref 65–100)
HCT VFR BLD AUTO: 39.1 % (ref 36.6–50.3)
HGB BLD-MCNC: 13.4 G/DL (ref 12.1–17)
LIPASE SERPL-CCNC: 355 U/L (ref 73–393)
LYMPHOCYTES # BLD AUTO: 37 % (ref 12–49)
LYMPHOCYTES # BLD: 4.9 K/UL (ref 0.8–3.5)
MCH RBC QN AUTO: 30.4 PG (ref 26–34)
MCHC RBC AUTO-ENTMCNC: 34.3 G/DL (ref 30–36.5)
MCV RBC AUTO: 88.7 FL (ref 80–99)
MONOCYTES # BLD: 0.6 K/UL (ref 0–1)
MONOCYTES NFR BLD AUTO: 5 % (ref 5–13)
NEUTS SEG # BLD: 7.2 K/UL (ref 1.8–8)
NEUTS SEG NFR BLD AUTO: 53 % (ref 32–75)
P-R INTERVAL, ECG05: 150 MS
PLATELET # BLD AUTO: 335 K/UL (ref 150–400)
POTASSIUM SERPL-SCNC: 3.9 MMOL/L (ref 3.5–5.1)
PROT SERPL-MCNC: 8.6 G/DL (ref 6.4–8.2)
Q-T INTERVAL, ECG07: 366 MS
QRS DURATION, ECG06: 82 MS
QTC CALCULATION (BEZET), ECG08: 411 MS
RBC # BLD AUTO: 4.41 M/UL (ref 4.1–5.7)
SODIUM SERPL-SCNC: 135 MMOL/L (ref 136–145)
TROPONIN I SERPL-MCNC: <0.04 NG/ML
VENTRICULAR RATE, ECG03: 76 BPM
WBC # BLD AUTO: 13.3 K/UL (ref 4.1–11.1)

## 2017-07-31 PROCEDURE — 74011250636 HC RX REV CODE- 250/636: Performed by: EMERGENCY MEDICINE

## 2017-07-31 PROCEDURE — 85379 FIBRIN DEGRADATION QUANT: CPT | Performed by: EMERGENCY MEDICINE

## 2017-07-31 PROCEDURE — 71020 XR CHEST PA LAT: CPT

## 2017-07-31 PROCEDURE — 84484 ASSAY OF TROPONIN QUANT: CPT | Performed by: EMERGENCY MEDICINE

## 2017-07-31 PROCEDURE — 83690 ASSAY OF LIPASE: CPT | Performed by: EMERGENCY MEDICINE

## 2017-07-31 PROCEDURE — 36415 COLL VENOUS BLD VENIPUNCTURE: CPT | Performed by: EMERGENCY MEDICINE

## 2017-07-31 PROCEDURE — 93005 ELECTROCARDIOGRAM TRACING: CPT

## 2017-07-31 PROCEDURE — 80053 COMPREHEN METABOLIC PANEL: CPT | Performed by: EMERGENCY MEDICINE

## 2017-07-31 PROCEDURE — 74011250637 HC RX REV CODE- 250/637: Performed by: EMERGENCY MEDICINE

## 2017-07-31 PROCEDURE — 82550 ASSAY OF CK (CPK): CPT | Performed by: EMERGENCY MEDICINE

## 2017-07-31 PROCEDURE — 99285 EMERGENCY DEPT VISIT HI MDM: CPT

## 2017-07-31 PROCEDURE — 85025 COMPLETE CBC W/AUTO DIFF WBC: CPT | Performed by: EMERGENCY MEDICINE

## 2017-07-31 PROCEDURE — 96360 HYDRATION IV INFUSION INIT: CPT

## 2017-07-31 RX ORDER — PREDNISONE 20 MG/1
40 TABLET ORAL DAILY
Qty: 10 TAB | Refills: 0 | Status: SHIPPED | OUTPATIENT
Start: 2017-07-31 | End: 2017-08-05

## 2017-07-31 RX ORDER — DOXYCYCLINE HYCLATE 100 MG
100 TABLET ORAL
Status: COMPLETED | OUTPATIENT
Start: 2017-07-31 | End: 2017-07-31

## 2017-07-31 RX ORDER — DOXYCYCLINE 100 MG/1
100 CAPSULE ORAL 2 TIMES DAILY
Qty: 14 CAP | Refills: 0 | Status: SHIPPED | OUTPATIENT
Start: 2017-07-31 | End: 2017-08-28 | Stop reason: CLARIF

## 2017-07-31 RX ADMIN — SODIUM CHLORIDE 1000 ML: 900 INJECTION, SOLUTION INTRAVENOUS at 18:27

## 2017-07-31 RX ADMIN — DOXYCYCLINE HYCLATE 100 MG: 100 TABLET, COATED ORAL at 21:56

## 2017-07-31 NOTE — ED NOTES
Patient comes to the ER complaining of middle and lateral right sided pain that started a month ago after being seen at [facility] for pneumonia. Patient stated that the pain subsided a little but has not gone away completely. Patient complains of SOB when he inhales and on exertion that has been going on for about a month as well.

## 2017-07-31 NOTE — ED PROVIDER NOTES
HPI Comments: Emil Rodriguez is a 47 y.o. male who presents ambulatory to [facility] ED with cc of left lower chest pain. Pt describes the discomfort as a nonradiating constant pain since discharge from the hospital a month ago. He also endorses some minor nausea, chills, and sweats. Pt states that he was evaluated in the ED on 06/15/2017 for pleuritic chest pain with a productive cough and subsequently admitted for PNA. He notes that he was admitted for a week and discharged home on antibiotics. He notes that he has been compliant and has since finished course of antibiotics since discharge from the hospital, but states that L chest pain has persisted. He notes exacerbation of discomfort upon deep inspiration, coughing, or sneezing. Of note, pt also endorses hx of daily EtOH use, however, he notes that he had decreased EtOH use approximately 6 months ago. He specifically denies any fever at this time. PCP:None    PMHx: PNA  Social Hx: + daily smoking; - EtOH (former daily EtOH use, tapered off over the past 6 mo); + marijuana use three days ago    There are no other complains, changes, or physical findings at this time. The history is provided by the patient. No  was used. No past medical history on file. No past surgical history on file. No family history on file. Social History     Social History    Marital status: SINGLE     Spouse name: N/A    Number of children: N/A    Years of education: N/A     Occupational History    Not on file. Social History Main Topics    Smoking status: Not on file    Smokeless tobacco: Not on file    Alcohol use Not on file    Drug use: Not on file    Sexual activity: Not on file     Other Topics Concern    Not on file     Social History Narrative         ALLERGIES: Review of patient's allergies indicates no known allergies. Review of Systems   Constitutional: Positive for chills. Negative for appetite change, fatigue and fever. HENT: Negative. Negative for congestion, rhinorrhea, sinus pressure and sore throat. Eyes: Negative. Respiratory: Positive for cough. Negative for choking, chest tightness, shortness of breath and wheezing. Cardiovascular: Positive for chest pain. Negative for palpitations and leg swelling. Endocrine: Negative. Genitourinary: Negative. Negative for difficulty urinating, flank pain and urgency. Musculoskeletal: Negative. Skin: Negative. Neurological: Negative. Negative for dizziness, speech difficulty, weakness, light-headedness and numbness. Psychiatric/Behavioral: Negative. All other systems reviewed and are negative. Patient Vitals for the past 12 hrs:   Temp Pulse Resp BP SpO2   07/31/17 1902 - 75 16 119/87 100 %   07/31/17 1830 - 74 13 123/42 98 %   07/31/17 1803 - 79 14 126/73 98 %   07/31/17 1625 - 76 19 139/78 97 %   07/31/17 1419 98.1 °F (36.7 °C) 95 20 (!) 142/104 100 %            Physical Exam   Constitutional: He is oriented to person, place, and time. He appears well-developed and well-nourished. No distress. HENT:   Head: Normocephalic and atraumatic. Mouth/Throat: Oropharynx is clear and moist. No oropharyngeal exudate. Eyes: Conjunctivae and EOM are normal. Pupils are equal, round, and reactive to light. Neck: Normal range of motion. Neck supple. No JVD present. No tracheal deviation present. Cardiovascular: Normal rate, regular rhythm, normal heart sounds and intact distal pulses. No murmur heard. Pulmonary/Chest: Effort normal and breath sounds normal. No stridor. No respiratory distress. He has no wheezes. He has no rales. He exhibits no tenderness. Diminished in left base   Abdominal: Soft. He exhibits no distension. There is no tenderness. There is no rebound and no guarding. Musculoskeletal: Normal range of motion. He exhibits no edema or tenderness. Neurological: He is alert and oriented to person, place, and time.  No cranial nerve deficit. No gross motor or sensory deficits    Skin: Skin is warm and dry. He is not diaphoretic. Psychiatric: He has a normal mood and affect. His behavior is normal.   Nursing note and vitals reviewed. MDM  Number of Diagnoses or Management Options  Diagnosis management comments: DDx: PNA, bronchitis, chest wall strain, PE, pancreatitis       Amount and/or Complexity of Data Reviewed  Clinical lab tests: reviewed and ordered  Tests in the radiology section of CPT®: ordered and reviewed  Tests in the medicine section of CPT®: ordered and reviewed  Review and summarize past medical records: yes  Independent visualization of images, tracings, or specimens: yes    Patient Progress  Patient progress: stable    ED Course       Procedures    Progress Note:  5:54 PM  Discussed the risks of smoking and the benefits of smoking cessation as well as the long term sequelae of smoking with the pt. The patient verbalized their understanding.      LABORATORY TESTS:  Recent Results (from the past 12 hour(s))   EKG, 12 LEAD, INITIAL    Collection Time: 07/31/17  2:23 PM   Result Value Ref Range    Ventricular Rate 76 BPM    Atrial Rate 76 BPM    P-R Interval 150 ms    QRS Duration 82 ms    Q-T Interval 366 ms    QTC Calculation (Bezet) 411 ms    Calculated P Axis 62 degrees    Calculated R Axis 56 degrees    Calculated T Axis 44 degrees    Diagnosis       Normal sinus rhythm  Normal ECG  When compared with ECG of 15-ALMA-2017 05:16,  No significant change was found  Confirmed by Daysi Chase ([id number]) on 7/31/2017 2:57:40 PM     CBC WITH AUTOMATED DIFF    Collection Time: 07/31/17  4:19 PM   Result Value Ref Range    WBC 13.3 (H) 4.1 - 11.1 K/uL    RBC 4.41 4.10 - 5.70 M/uL    HGB 13.4 12.1 - 17.0 g/dL    HCT 39.1 36.6 - 50.3 %    MCV 88.7 80.0 - 99.0 FL    MCH 30.4 26.0 - 34.0 PG    MCHC 34.3 30.0 - 36.5 g/dL    RDW 14.7 (H) 11.5 - 14.5 %    PLATELET 480 254 - 498 K/uL    NEUTROPHILS 53 32 - 75 %    LYMPHOCYTES 37 12 - 49 %    MONOCYTES 5 5 - 13 %    EOSINOPHILS 4 0 - 7 %    BASOPHILS 1 0 - 1 %    ABS. NEUTROPHILS 7.2 1.8 - 8.0 K/UL    ABS. LYMPHOCYTES 4.9 (H) 0.8 - 3.5 K/UL    ABS. MONOCYTES 0.6 0.0 - 1.0 K/UL    ABS. EOSINOPHILS 0.5 (H) 0.0 - 0.4 K/UL    ABS. BASOPHILS 0.1 0.0 - 0.1 K/UL   METABOLIC PANEL, COMPREHENSIVE    Collection Time: 07/31/17  4:19 PM   Result Value Ref Range    Sodium 135 (L) 136 - 145 mmol/L    Potassium 3.9 3.5 - 5.1 mmol/L    Chloride 103 97 - 108 mmol/L    CO2 28 21 - 32 mmol/L    Anion gap 4 (L) 5 - 15 mmol/L    Glucose 80 65 - 100 mg/dL    BUN 18 6 - 20 MG/DL    Creatinine 0.84 0.70 - 1.30 MG/DL    BUN/Creatinine ratio 21 (H) 12 - 20      GFR est AA >60 >60 ml/min/1.73m2    GFR est non-AA >60 >60 ml/min/1.73m2    Calcium 9.2 8.5 - 10.1 MG/DL    Bilirubin, total 0.5 0.2 - 1.0 MG/DL    ALT (SGPT) 25 12 - 78 U/L    AST (SGOT) 17 15 - 37 U/L    Alk. phosphatase 77 45 - 117 U/L    Protein, total 8.6 (H) 6.4 - 8.2 g/dL    Albumin 3.8 3.5 - 5.0 g/dL    Globulin 4.8 (H) 2.0 - 4.0 g/dL    A-G Ratio 0.8 (L) 1.1 - 2.2     CK W/ REFLX CKMB    Collection Time: 07/31/17  4:19 PM   Result Value Ref Range    CK 73 39 - 308 U/L   TROPONIN I    Collection Time: 07/31/17  4:19 PM   Result Value Ref Range    Troponin-I, Qt. <0.04 <0.05 ng/mL   LIPASE    Collection Time: 07/31/17  4:19 PM   Result Value Ref Range    Lipase 355 73 - 393 U/L   D DIMER    Collection Time: 07/31/17  6:22 PM   Result Value Ref Range    D-dimer 0.62 0.00 - 0.65 mg/L FEU       IMAGING RESULTS:  CXR Results  (Last 48 hours)               07/31/17 1914  XR CHEST PA LAT Final result    Narrative:  EXAM:  XR CHEST PA LAT       INDICATION:   sob, CP. Shortness of breath. Left-sided pain       COMPARISON: 6/22/2017. FINDINGS: PA and lateral radiographs of the chest demonstrate density at the   left lung base posteriorly consistent with pleural effusion and/or consolidation   in the posterior left lower lobe. Right lung is clear. . The cardiac and mediastinal contours and pulmonary vascularity are normal.  The bones and soft   tissues are within normal limits. IMPRESSION: Left basilar density consistent with airspace process possibly with   pleural effusion. VITALS:  Patient Vitals for the past 12 hrs:   Temp Pulse Resp BP SpO2   07/31/17 1902 - 75 16 119/87 100 %   07/31/17 1830 - 74 13 123/42 98 %   07/31/17 1803 - 79 14 126/73 98 %   07/31/17 1625 - 76 19 139/78 97 %   07/31/17 1419 98.1 °F (36.7 °C) 95 20 (!) 142/104 100 %       MEDICATIONS GIVEN:  Medications   doxycycline (VIBRA-TABS) tablet 100 mg (not administered)   sodium chloride 0.9 % bolus infusion 1,000 mL (1,000 mL IntraVENous New Bag 7/31/17 1827)       IMPRESSION:  1. Pneumonia of left lower lobe due to infectious organism        PLAN:  1. Current Discharge Medication List      START taking these medications    Details   doxycycline (VIBRAMYCIN) 100 mg capsule Take 1 Cap by mouth two (2) times a day. Qty: 14 Cap, Refills: 0      predniSONE (DELTASONE) 20 mg tablet Take 2 Tabs by mouth daily for 5 days. With Breakfast  Qty: 10 Tab, Refills: 0           2. Follow-up Information     Follow up With Details Comments Contact Info    None  As needed None ([phone]) Patient stated that they have no PCP          3. Return to ED if worse     Discharge Note:  9:40 PM  The patient has been re-evaluated and is ready for discharge. Reviewed available results with patient. Counseled patient on diagnosis and care plan. Patient has expressed understanding, and all questions have been answered. Patient agrees with plan and agrees to follow up as recommended, or to return to the ED if their symptoms worsen. Discharge instructions have been provided and explained to the patient, along with reasons to return to the ED.         This note is prepared by Ventura Conteh, acting as Scribe for Jonathan Sanchez, [address] [place] [address], DO: The scribe's documentation has been prepared under my direction and personally reviewed by me in its entirety. I confirm that the note above accurately reflects all work, treatment, procedures, and medical decision making performed by me.

## 2017-07-31 NOTE — ED NOTES
Bedside and Verbal shift change report given to Jada Walker RN (oncoming nurse) by Rafa Aviles RN (offgoing nurse). Report included the following information SBAR, Kardex, ED Summary, MAR, Recent Results and Med Rec Status.

## 2017-08-01 NOTE — DISCHARGE INSTRUCTIONS
Pneumonia: Care Instructions  Your Care Instructions    Pneumonia is an infection of the lungs. Most cases are caused by infections from bacteria or viruses. Pneumonia may be mild or very severe. If it is caused by bacteria, you will be treated with antibiotics. It may take a few weeks to a few months to recover fully from pneumonia, depending on how sick you were and whether your overall health is good. Follow-up care is a key part of your treatment and safety. Be sure to make and go to all appointments, and call your doctor if you are having problems. Its also a good idea to know your test results and keep a list of the medicines you take. How can you care for yourself at home? · Take your antibiotics exactly as directed. Do not stop taking the medicine just because you are feeling better. You need to take the full course of antibiotics. · Take your medicines exactly as prescribed. Call your doctor if you think you are having a problem with your medicine. · Get plenty of rest and sleep. You may feel weak and tired for a while, but your energy level will improve with time. · To prevent dehydration, drink plenty of fluids, enough so that your urine is light yellow or clear like water. Choose water and other caffeine-free clear liquids until you feel better. If you have kidney, heart, or liver disease and have to limit fluids, talk with your doctor before you increase the amount of fluids you drink. · Take care of your cough so you can rest. A cough that brings up mucus from your lungs is common with pneumonia. It is one way your body gets rid of the infection. But if coughing keeps you from resting or causes severe fatigue and chest-wall pain, talk to your doctor. He or she may suggest that you take a medicine to reduce the cough. · Use a vaporizer or humidifier to add moisture to your bedroom. Follow the directions for cleaning the machine. · Do not smoke or allow others to smoke around you.  Smoke will make your cough last longer. If you need help quitting, talk to your doctor about stop-smoking programs and medicines. These can increase your chances of quitting for good. · Take an over-the-counter pain medicine, such as acetaminophen (Tylenol), ibuprofen (Advil, Motrin), or naproxen (Aleve). Read and follow all instructions on the label. · Do not take two or more pain medicines at the same time unless the doctor told you to. Many pain medicines have acetaminophen, which is Tylenol. Too much acetaminophen (Tylenol) can be harmful. · If you were given a spirometer to measure how well your lungs are working, use it as instructed. This can help your doctor tell how your recovery is going. · To prevent pneumonia in the future, talk to your doctor about getting a flu vaccine (once a year) and a pneumococcal vaccine (one time only for most people). When should you call for help? Call 911 anytime you think you may need emergency care. For example, call if:  · You have severe trouble breathing. Call your doctor now or seek immediate medical care if:  · You cough up dark brown or bloody mucus (sputum). · You have new or worse trouble breathing. · You are dizzy or lightheaded, or you feel like you may faint. Watch closely for changes in your health, and be sure to contact your doctor if:  · You have a new or higher fever. · You are coughing more deeply or more often. · You are not getting better after 2 days (48 hours). · You do not get better as expected. Where can you learn more? Go to http://alberta-yogesh.info/. Enter [id number] in the search box to learn more about \"Pneumonia: Care Instructions. \"  Current as of: March 25, 2017  Content Version: 11.3  © 4007-9612 [employer]. Care instructions adapted under license by [employer] (which disclaims liability or warranty for this information).  If you have questions about a medical condition or this instruction, always ask your healthcare professional. [place] [address] any warranty or liability for your use of this information.

## 2017-08-01 NOTE — ED NOTES
Discharge instructions reviewed with pt and copy given along with RX by Dr Aime Fay. All questions answered at this time. Pt discharged ambulatory home. Pt refused wheelchair.

## 2017-08-16 ENCOUNTER — APPOINTMENT (OUTPATIENT)
Dept: ULTRASOUND IMAGING | Age: 52
End: 2017-08-16
Attending: EMERGENCY MEDICINE
Payer: SELF-PAY

## 2017-08-16 ENCOUNTER — HOSPITAL ENCOUNTER (OUTPATIENT)
Age: 52
Setting detail: OBSERVATION
Discharge: HOME | End: 2017-08-17
Attending: EMERGENCY MEDICINE | Admitting: FAMILY MEDICINE
Payer: SELF-PAY

## 2017-08-16 ENCOUNTER — APPOINTMENT (OUTPATIENT)
Dept: GENERAL RADIOLOGY | Age: 52
End: 2017-08-16
Attending: EMERGENCY MEDICINE
Payer: SELF-PAY

## 2017-08-16 DIAGNOSIS — K80.50 BILIARY COLIC: ICD-10-CM

## 2017-08-16 DIAGNOSIS — K83.8 DILATED CBD, ACQUIRED: Primary | ICD-10-CM

## 2017-08-16 DIAGNOSIS — R10.11 RUQ PAIN: ICD-10-CM

## 2017-08-16 LAB
ALBUMIN SERPL-MCNC: 3.8 G/DL (ref 3.5–5)
ALBUMIN/GLOB SERPL: 0.9 {RATIO} (ref 1.1–2.2)
ALP SERPL-CCNC: 68 U/L (ref 45–117)
ALT SERPL-CCNC: 33 U/L (ref 12–78)
ANION GAP SERPL CALC-SCNC: 7 MMOL/L (ref 5–15)
AST SERPL-CCNC: 21 U/L (ref 15–37)
BASOPHILS # BLD: 0 K/UL (ref 0–0.1)
BASOPHILS NFR BLD: 0 % (ref 0–1)
BILIRUB SERPL-MCNC: 0.5 MG/DL (ref 0.2–1)
BUN SERPL-MCNC: 35 MG/DL (ref 6–20)
BUN/CREAT SERPL: 27 (ref 12–20)
CALCIUM SERPL-MCNC: 9.2 MG/DL (ref 8.5–10.1)
CHLORIDE SERPL-SCNC: 102 MMOL/L (ref 97–108)
CO2 SERPL-SCNC: 26 MMOL/L (ref 21–32)
CREAT SERPL-MCNC: 1.28 MG/DL (ref 0.7–1.3)
EOSINOPHIL # BLD: 0.4 K/UL (ref 0–0.4)
EOSINOPHIL NFR BLD: 2 % (ref 0–7)
ERYTHROCYTE [DISTWIDTH] IN BLOOD BY AUTOMATED COUNT: 15.2 % (ref 11.5–14.5)
GLOBULIN SER CALC-MCNC: 4.3 G/DL (ref 2–4)
GLUCOSE SERPL-MCNC: 97 MG/DL (ref 65–100)
HCT VFR BLD AUTO: 45.8 % (ref 36.6–50.3)
HGB BLD-MCNC: 15.7 G/DL (ref 12.1–17)
LIPASE SERPL-CCNC: 147 U/L (ref 73–393)
LYMPHOCYTES # BLD: 3 K/UL (ref 0.8–3.5)
LYMPHOCYTES NFR BLD: 21 % (ref 12–49)
MCH RBC QN AUTO: 30.9 PG (ref 26–34)
MCHC RBC AUTO-ENTMCNC: 34.3 G/DL (ref 30–36.5)
MCV RBC AUTO: 90.2 FL (ref 80–99)
MONOCYTES # BLD: 0.8 K/UL (ref 0–1)
MONOCYTES NFR BLD: 6 % (ref 5–13)
NEUTS SEG # BLD: 10.3 K/UL (ref 1.8–8)
NEUTS SEG NFR BLD: 71 % (ref 32–75)
PLATELET # BLD AUTO: 229 K/UL (ref 150–400)
POTASSIUM SERPL-SCNC: 4.6 MMOL/L (ref 3.5–5.1)
PROT SERPL-MCNC: 8.1 G/DL (ref 6.4–8.2)
RBC # BLD AUTO: 5.08 M/UL (ref 4.1–5.7)
SODIUM SERPL-SCNC: 135 MMOL/L (ref 136–145)
WBC # BLD AUTO: 14.6 K/UL (ref 4.1–11.1)

## 2017-08-16 PROCEDURE — 96366 THER/PROPH/DIAG IV INF ADDON: CPT

## 2017-08-16 PROCEDURE — 83690 ASSAY OF LIPASE: CPT | Performed by: EMERGENCY MEDICINE

## 2017-08-16 PROCEDURE — 36415 COLL VENOUS BLD VENIPUNCTURE: CPT | Performed by: EMERGENCY MEDICINE

## 2017-08-16 PROCEDURE — 74022 RADEX COMPL AQT ABD SERIES: CPT

## 2017-08-16 PROCEDURE — 96375 TX/PRO/DX INJ NEW DRUG ADDON: CPT

## 2017-08-16 PROCEDURE — 96361 HYDRATE IV INFUSION ADD-ON: CPT

## 2017-08-16 PROCEDURE — 74011000250 HC RX REV CODE- 250: Performed by: EMERGENCY MEDICINE

## 2017-08-16 PROCEDURE — 99284 EMERGENCY DEPT VISIT MOD MDM: CPT

## 2017-08-16 PROCEDURE — 74011250636 HC RX REV CODE- 250/636: Performed by: EMERGENCY MEDICINE

## 2017-08-16 PROCEDURE — 85025 COMPLETE CBC W/AUTO DIFF WBC: CPT | Performed by: EMERGENCY MEDICINE

## 2017-08-16 PROCEDURE — 96365 THER/PROPH/DIAG IV INF INIT: CPT

## 2017-08-16 PROCEDURE — 99218 HC RM OBSERVATION: CPT

## 2017-08-16 PROCEDURE — 76705 ECHO EXAM OF ABDOMEN: CPT

## 2017-08-16 PROCEDURE — 80053 COMPREHEN METABOLIC PANEL: CPT | Performed by: EMERGENCY MEDICINE

## 2017-08-16 PROCEDURE — 74011250637 HC RX REV CODE- 250/637: Performed by: EMERGENCY MEDICINE

## 2017-08-16 RX ORDER — HYDROMORPHONE HYDROCHLORIDE 1 MG/ML
1 INJECTION, SOLUTION INTRAMUSCULAR; INTRAVENOUS; SUBCUTANEOUS
Status: DISCONTINUED | OUTPATIENT
Start: 2017-08-16 | End: 2017-08-17

## 2017-08-16 RX ORDER — ONDANSETRON 2 MG/ML
4 INJECTION INTRAMUSCULAR; INTRAVENOUS
Status: COMPLETED | OUTPATIENT
Start: 2017-08-16 | End: 2017-08-16

## 2017-08-16 RX ORDER — ONDANSETRON 2 MG/ML
4 INJECTION INTRAMUSCULAR; INTRAVENOUS
Status: DISCONTINUED | OUTPATIENT
Start: 2017-08-16 | End: 2017-08-17

## 2017-08-16 RX ORDER — SODIUM CHLORIDE 9 MG/ML
50 INJECTION, SOLUTION INTRAVENOUS CONTINUOUS
Status: DISCONTINUED | OUTPATIENT
Start: 2017-08-16 | End: 2017-08-17 | Stop reason: HOSPADM

## 2017-08-16 RX ORDER — FENTANYL CITRATE 50 UG/ML
50 INJECTION, SOLUTION INTRAMUSCULAR; INTRAVENOUS
Status: COMPLETED | OUTPATIENT
Start: 2017-08-16 | End: 2017-08-16

## 2017-08-16 RX ORDER — SODIUM CHLORIDE 0.9 % (FLUSH) 0.9 %
5-10 SYRINGE (ML) INJECTION EVERY 8 HOURS
Status: DISCONTINUED | OUTPATIENT
Start: 2017-08-16 | End: 2017-08-17 | Stop reason: HOSPADM

## 2017-08-16 RX ORDER — SODIUM CHLORIDE 0.9 % (FLUSH) 0.9 %
5-10 SYRINGE (ML) INJECTION AS NEEDED
Status: DISCONTINUED | OUTPATIENT
Start: 2017-08-16 | End: 2017-08-17 | Stop reason: HOSPADM

## 2017-08-16 RX ORDER — LORAZEPAM 2 MG/ML
1 INJECTION INTRAMUSCULAR
Status: DISCONTINUED | OUTPATIENT
Start: 2017-08-16 | End: 2017-08-17

## 2017-08-16 RX ADMIN — LIDOCAINE HYDROCHLORIDE 40 ML: 20 SOLUTION ORAL; TOPICAL at 18:37

## 2017-08-16 RX ADMIN — ONDANSETRON HYDROCHLORIDE 4 MG: 2 INJECTION, SOLUTION INTRAMUSCULAR; INTRAVENOUS at 19:32

## 2017-08-16 RX ADMIN — FENTANYL CITRATE 50 MCG: 50 INJECTION, SOLUTION INTRAMUSCULAR; INTRAVENOUS at 19:33

## 2017-08-16 NOTE — ED PROVIDER NOTES
HPI Comments: Taya Carr is a 47 y.o. male with PMhx significant for gastric ulcer who presents via EMS to the ED with cc of intermittent abdominal pain with nausea/vomiting onset ~1730 today. Pt notes his pain intensity is waxing and waning. Pt reports he hasn't been eating well today and endorses eating cheeseburger at 1400. Pt states he hasn't taken his second dose of antibiotics, and notes the antibiotics cause him some nausea/vomitting. Pt denies a hx of pancreas, gallbladder or liver problems. Pt specifically denies any urinary sxs, diarrhea, constipation, fever or chills. Social Hx: + Tobacco, - EtOH, +(radha) Illicit Drugs    PCP: None    There are no other complaints, changes or physical findings at this time. The history is provided by the patient. No  was used. History reviewed. No pertinent past medical history. History reviewed. No pertinent surgical history. History reviewed. No pertinent family history. Social History     Social History    Marital status: SINGLE     Spouse name: N/A    Number of children: N/A    Years of education: N/A     Occupational History    Not on file. Social History Main Topics    Smoking status: Current Every Day Smoker     Packs/day: 0.50    Smokeless tobacco: Never Used    Alcohol use No      Comment: hx of daily EtOH, none for the past 6 months as of 07/31/2017    Drug use: Yes     Special: Marijuana    Sexual activity: Yes     Partners: Female     Birth control/ protection: None     Other Topics Concern    Not on file     Social History Narrative         ALLERGIES: Review of patient's allergies indicates no known allergies. Review of Systems   Constitutional: Negative for chills and fever. Respiratory: Negative for cough and shortness of breath. Cardiovascular: Negative for chest pain. Gastrointestinal: Positive for abdominal pain, nausea and vomiting. Negative for constipation and diarrhea. Genitourinary: Negative for dysuria and hematuria. Neurological: Negative for weakness and numbness. All other systems reviewed and are negative. Vitals:    08/16/17 1824   BP: (!) 118/91   Pulse: (!) 103   Resp: 16   Temp: 97.6 °F (36.4 °C)   SpO2: 99%   Weight: 65.8 kg (145 lb)   Height: 5' 8\" (1.727 m)            Physical Exam   Constitutional: He is oriented to person, place, and time. He appears well-developed and well-nourished. HENT:   Head: Normocephalic and atraumatic. Eyes: Conjunctivae and EOM are normal.   Neck: Normal range of motion. Neck supple. Cardiovascular: Normal rate and regular rhythm. Pulmonary/Chest: Effort normal and breath sounds normal. No respiratory distress. Abdominal: Soft. He exhibits no distension. There is no tenderness. Moderate epigastric tenderness and RUQ tenderness   Musculoskeletal: Normal range of motion. Neurological: He is alert and oriented to person, place, and time. Skin: Skin is warm and dry. Psychiatric: He has a normal mood and affect. Nursing note and vitals reviewed. MDM  Number of Diagnoses or Management Options  Diagnosis management comments: Patient presents with epigastric abdominal pain. Differential includes gastritis, pancreatitis cholelithiasis, cholecystitis, hepatitis, muscular strain, renal pathology, gastroenteritis. Less likely ACS. Will obtain labs and possibly US. Will give fluids, analgesics and antiemetics PRN.           Amount and/or Complexity of Data Reviewed  Clinical lab tests: ordered and reviewed  Tests in the radiology section of CPT®: ordered and reviewed  Review and summarize past medical records: yes  Discuss the patient with other providers: yes (General Surgery)  Independent visualization of images, tracings, or specimens: yes    Patient Progress  Patient progress: stable    ED Course       Procedures    PROGRESS NOTE:   7:24 PM  Pt is currently vomiting and is asking for something for nausea and pain.     CONSULT NOTE:  9:15 PM  Ca Guerra MD spoke with Alessandro Wells,  Specialty: General Surgery  Discussed patient's hx, disposition, and available diagnostic and imaging results. Reviewed care plans. Consultant agrees with plans as outlined. Dr. Fred Nolan will come in and evaluate patient.

## 2017-08-16 NOTE — IP AVS SNAPSHOT
[address] [facility] 
[phone] Patient: Kerline Perez MRN: [id number] :1965 You are allergic to the following No active allergies Recent Documentation Height Weight BMI Smoking Status 1.727 m 65.8 kg 22.05 kg/m2 Current Every Day Smoker About your hospitalization You were admitted on:  2017 You last received care in the:  [facility] [place] [facility] GENERAL SURGERY You were discharged on:  2017 Unit phone number:  [phone] Why you were hospitalized Your primary diagnosis was:  Not on File Your diagnoses also included:  Ruq Pain Providers Seen During Your Hospitalizations Provider Role Specialty Primary office phone Wolfgang Satniago MD Attending Provider Emergency Medicine [phone] Crispin Pruitt MD Attending Provider General Surgery [phone] Your Primary Care Physician (PCP) Primary Care Physician Office Phone Office Fax NONE ** None ** ** None ** Follow-up Information Follow up With Details Comments Contact Info None   None (395) Patient stated that they have no PCP Current Discharge Medication List  
  
START taking these medications Dose & Instructions Dispensing Information Comments Morning Noon Evening Bedtime HYDROcodone-acetaminophen 5-325 mg per tablet Commonly known as:  Marleny Iron Your last dose was: Your next dose is:    
   
   
 Dose:  1-2 Tab Take 1-2 Tabs by mouth every four (4) hours as needed. Max Daily Amount: 12 Tabs. Quantity:  20 Tab Refills:  0  
     
   
   
   
  
 ondansetron 4 mg disintegrating tablet Commonly known as:  ZOFRAN ODT Your last dose was: Your next dose is:    
   
   
 Dose:  4 mg Take 1 Tab by mouth every eight (8) hours as needed. Quantity:  10 Tab Refills:  0 CONTINUE these medications which have NOT CHANGED Dose & Instructions Dispensing Information Comments Morning Noon Evening Bedtime  
 doxycycline 100 mg capsule Commonly known as:  VIBRAMYCIN Your last dose was: Your next dose is:    
   
   
 Dose:  100 mg Take 1 Cap by mouth two (2) times a day. Quantity:  14 Cap Refills:  0 Where to Get Your Medications Information on where to get these meds will be given to you by the nurse or doctor. ! Ask your nurse or doctor about these medications HYDROcodone-acetaminophen 5-325 mg per tablet  
 ondansetron 4 mg disintegrating tablet Discharge Instructions Learning About Gallstones What are gallstones? Gallstones are stones that form in the gallbladder. The gallbladder is a small sac located just under the liver. It stores bile released by the liver. Bile helps you digest fats. Gallstones can be smaller than a grain of sand or as large as a golf ball. Gallstones form when cholesterol and other things found in bile make stones. They can also form if the gallbladder doesn't empty as it should. Gallstones can also form in the common bile duct or cystic duct. These tubes carry bile from the gallbladder and the liver to the small intestine. What happens when you have gallstones? Gallstones can cause many different problems, such as: · A blockage in the common bile duct. · Inflammation or infection of the gallbladder (acute cholecystitis). This can happen when a gallstone blocks the cystic duct. · Inflammation or infection of the common bile duct (cholangitis). This can happen when gallstones get stuck in the common bile duct. In rare cases, this can damage the liver or spread infection. · Pancreatitis, an inflammation of the pancreas. What are the symptoms? · Most people who have gallstones don't have symptoms. · Symptoms can include: ¨ Mild pain in the pit of your stomach or in the upper right part of your belly. It may spread to your right upper back or shoulder blade area. ¨ Severe pain that may come and go or be steady. It may get worse when you eat. ¨ Fever and chills, if a gallstone is blocking a bile duct and causing an infection. ¨ Yellowing of your skin and the whites of your eyes. How can you prevent gallstones? There is no sure way to prevent gallstones. But you can reduce your risk of forming gallstones that can cause symptoms. · Stay at a healthy weight. If you need to lose weight, do so slowly and sensibly. · Eat regular, balanced meals. · Be active, and exercise regularly. How are gallstones treated? · If you don't have symptoms, you probably don't need treatment. · For mild symptoms, your doctor may have you take pain medicine and wait to see if the pain goes away. · For severe pain or infection, or if you have more than one gallstone attack, your doctor may suggest surgery to have your gallbladder removed. The body works fine without a gallbladder. Follow-up care is a key part of your treatment and safety. Be sure to make and go to all appointments, and call your doctor if you are having problems. It's also a good idea to know your test results and keep a list of the medicines you take. Where can you learn more? Go to http://alberta-yogesh.info/. Enter  in the search box to learn more about \"Learning About Gallstones. \" Current as of: August 9, 2016 Content Version: 11.3 © 1780-0040 [employer]. Care instructions adapted under license by [employer] (which disclaims liability or warranty for this information). If you have questions about a medical condition or this instruction, always ask your healthcare professional. [place] [address] any warranty or liability for your use of this information. Discharge Orders None cinvolve Announcement We are excited to announce that we are making your provider's discharge notes available to you in cinvolve. You will see these notes when they are completed and signed by the physician that discharged you from your recent hospital stay. If you have any questions or concerns about any information you see in cinvolve, please call the Health Information Department where you were seen or reach out to your Primary Care Provider for more information about your plan of care. Introducing [facility]! Yareli Sanchez introduces cinvolve patient portal. Now you can access parts of your medical record, email your doctor's office, and request medication refills online. 1. In your internet browser, go to [email or web address] 2. Click on the First Time User? Click Here link in the Sign In box. You will see the New Member Sign Up page. 3. Enter your cinvolve Access Code exactly as it appears below. You will not need to use this code after youve completed the sign-up process. If you do not sign up before the expiration date, you must request a new code. · cinvolve Access Code: [email or web address] Expires: 9/20/2017 12:32 PM 
 
4. Enter the last four digits of your Social Security Number (xxxx) and Date of Birth (mm/dd/yyyy) as indicated and click Submit. You will be taken to the next sign-up page. 5. Create a cinvolve ID. This will be your cinvolve login ID and cannot be changed, so think of one that is secure and easy to remember. 6. Create a cinvolve password. You can change your password at any time. 7. Enter your Password Reset Question and Answer. This can be used at a later time if you forget your password. 8. Enter your e-mail address. You will receive e-mail notification when new information is available in [address] 9. Click Sign Up. You can now view and download portions of your medical record. 10. Click the Download Summary menu link to download a portable copy of your medical information. If you have questions, please visit the Frequently Asked Questions section of the [employer] website. Remember, MyChart is NOT to be used for urgent needs. For medical emergencies, dial 911. Now available from your iPhone and Android! General Information Please provide this summary of care documentation to your next provider. Patient Signature:  ____________________________________________________________ Date:  ____________________________________________________________  
  
Shira Spare Provider Signature:  ____________________________________________________________ Date:  ____________________________________________________________

## 2017-08-16 NOTE — IP AVS SNAPSHOT
[address] [facility] 
[phone] Patient: Emil Rodriguez MRN: [id number] :1965 Current Discharge Medication List  
  
START taking these medications Dose & Instructions Dispensing Information Comments Morning Noon Evening Bedtime HYDROcodone-acetaminophen 5-325 mg per tablet Commonly known as:  Julia Fore Your last dose was: Your next dose is:    
   
   
 Dose:  1-2 Tab Take 1-2 Tabs by mouth every four (4) hours as needed. Max Daily Amount: 12 Tabs. Quantity:  20 Tab Refills:  0  
     
   
   
   
  
 ondansetron 4 mg disintegrating tablet Commonly known as:  ZOFRAN ODT Your last dose was: Your next dose is:    
   
   
 Dose:  4 mg Take 1 Tab by mouth every eight (8) hours as needed. Quantity:  10 Tab Refills:  0 CONTINUE these medications which have NOT CHANGED Dose & Instructions Dispensing Information Comments Morning Noon Evening Bedtime  
 doxycycline 100 mg capsule Commonly known as:  VIBRAMYCIN Your last dose was: Your next dose is:    
   
   
 Dose:  100 mg Take 1 Cap by mouth two (2) times a day. Quantity:  14 Cap Refills:  0 Where to Get Your Medications Information on where to get these meds will be given to you by the nurse or doctor. ! Ask your nurse or doctor about these medications HYDROcodone-acetaminophen 5-325 mg per tablet  
 ondansetron 4 mg disintegrating tablet

## 2017-08-16 NOTE — IP AVS SNAPSHOT
Summary of Care Report The Summary of Care report has been created to help improve care coordination. Users with access to [place] (RTP) or [facility] (Web-based application) may access additional patient information including the Discharge Summary. If you are not currently a [address] user and need more information, please call the number listed below in the [address] section and ask to be connected with Medical Records. Facility Information Name Address Phone [address] [address] [phone] Patient Information Patient Name Sex JUAN Turner ([id number]) Male 1965 Discharge Information Admitting Provider Service Area Unit Estephania Maldonado MD / [phone] [facility] 2 General Surgery / [phone] Discharge Provider Discharge Date/Time Discharge Disposition Destination (none) 2017 Evening (Pending) AHR (none) Patient Language Language ENGLISH [13] Hospital Problems as of 2017  Reviewed: 2017  2:58 PM by Jane Vivas MD  
  
  
  
 Class Noted - Resolved Last Modified POA Active Problems RUQ pain  2017 - Present 2017 by Estephania Maldonado MD Unknown Entered by Estephania Maldonado MD  
  
Non-Hospital Problems as of 2017  Reviewed: 2017  2:58 PM by Jane Vivas MD  
  
  
  
 Class Noted - Resolved Last Modified Active Problems   Sepsis ([address]  6/15/2017 - Present 6/15/2017 by Jane Vivas MD  
  Entered by Arnold Barbosa MD  
  Acute respiratory failure with hypoxia ([address]  6/15/2017 - Present 6/15/2017 by Jane Vivas MD  
  Entered by Arnold Barbosa MD  
  Community acquired pneumonia  6/15/2017 - Present 6/15/2017 by Jane Vivas MD  
  Entered by Arnold Barbosa MD  
 D-dimer, elevated  6/15/2017 - Present 6/15/2017 by Tiffany Villa MD  
  Entered by Myah Chan MD  
  Pleural effusion, left  6/17/2017 - Present 6/17/2017 by Tiffany Villa MD  
  Entered by Tiffany Villa MD  
  
You are allergic to the following No active allergies Current Discharge Medication List  
  
START taking these medications Dose & Instructions Dispensing Information Comments HYDROcodone-acetaminophen 5-325 mg per tablet Commonly known as:  Marleny Iron Dose:  1-2 Tab Take 1-2 Tabs by mouth every four (4) hours as needed. Max Daily Amount: 12 Tabs. Quantity:  20 Tab Refills:  0  
   
 ondansetron 4 mg disintegrating tablet Commonly known as:  ZOFRAN ODT Dose:  4 mg Take 1 Tab by mouth every eight (8) hours as needed. Quantity:  10 Tab Refills:  0 CONTINUE these medications which have NOT CHANGED Dose & Instructions Dispensing Information Comments  
 doxycycline 100 mg capsule Commonly known as:  VIBRAMYCIN Dose:  100 mg Take 1 Cap by mouth two (2) times a day. Quantity:  14 Cap Refills:  0 Follow-up Information Follow up With Details Comments Contact Info None   None (395) Patient stated that they have no PCP Discharge Instructions Learning About Gallstones What are gallstones? Gallstones are stones that form in the gallbladder. The gallbladder is a small sac located just under the liver. It stores bile released by the liver. Bile helps you digest fats. Gallstones can be smaller than a grain of sand or as large as a golf ball. Gallstones form when cholesterol and other things found in bile make stones. They can also form if the gallbladder doesn't empty as it should. Gallstones can also form in the common bile duct or cystic duct. These tubes carry bile from the gallbladder and the liver to the small intestine. What happens when you have gallstones? Gallstones can cause many different problems, such as: · A blockage in the common bile duct. · Inflammation or infection of the gallbladder (acute cholecystitis). This can happen when a gallstone blocks the cystic duct. · Inflammation or infection of the common bile duct (cholangitis). This can happen when gallstones get stuck in the common bile duct. In rare cases, this can damage the liver or spread infection. · Pancreatitis, an inflammation of the pancreas. What are the symptoms? · Most people who have gallstones don't have symptoms. · Symptoms can include: ¨ Mild pain in the pit of your stomach or in the upper right part of your belly. It may spread to your right upper back or shoulder blade area. ¨ Severe pain that may come and go or be steady. It may get worse when you eat. ¨ Fever and chills, if a gallstone is blocking a bile duct and causing an infection. ¨ Yellowing of your skin and the whites of your eyes. How can you prevent gallstones? There is no sure way to prevent gallstones. But you can reduce your risk of forming gallstones that can cause symptoms. · Stay at a healthy weight. If you need to lose weight, do so slowly and sensibly. · Eat regular, balanced meals. · Be active, and exercise regularly. How are gallstones treated? · If you don't have symptoms, you probably don't need treatment. · For mild symptoms, your doctor may have you take pain medicine and wait to see if the pain goes away. · For severe pain or infection, or if you have more than one gallstone attack, your doctor may suggest surgery to have your gallbladder removed. The body works fine without a gallbladder. Follow-up care is a key part of your treatment and safety. Be sure to make and go to all appointments, and call your doctor if you are having problems. It's also a good idea to know your test results and keep a list of the medicines you take. Where can you learn more? Go to http://alberta-yogesh.info/. Enter  in the search box to learn more about \"Learning About Gallstones. \" Current as of: August 9, 2016 Content Version: 11.3 © 1317-1973 [employer]. Care instructions adapted under license by [facility] (which disclaims liability or warranty for this information). If you have questions about a medical condition or this instruction, always ask your healthcare professional. [place] [address] any warranty or liability for your use of this information. Chart Review Routing History No Routing History on File

## 2017-08-17 ENCOUNTER — APPOINTMENT (OUTPATIENT)
Dept: NUCLEAR MEDICINE | Age: 52
End: 2017-08-17
Attending: FAMILY MEDICINE
Payer: SELF-PAY

## 2017-08-17 VITALS
BODY MASS INDEX: 21.98 KG/M2 | WEIGHT: 145 LBS | SYSTOLIC BLOOD PRESSURE: 123 MMHG | HEIGHT: 68 IN | TEMPERATURE: 98.6 F | OXYGEN SATURATION: 98 % | HEART RATE: 77 BPM | DIASTOLIC BLOOD PRESSURE: 74 MMHG | RESPIRATION RATE: 16 BRPM

## 2017-08-17 LAB
ALBUMIN SERPL-MCNC: 3.2 G/DL (ref 3.5–5)
ALBUMIN/GLOB SERPL: 0.8 {RATIO} (ref 1.1–2.2)
ALP SERPL-CCNC: 59 U/L (ref 45–117)
ALT SERPL-CCNC: 28 U/L (ref 12–78)
ANION GAP SERPL CALC-SCNC: 5 MMOL/L (ref 5–15)
AST SERPL-CCNC: 16 U/L (ref 15–37)
BILIRUB SERPL-MCNC: 1 MG/DL (ref 0.2–1)
BUN SERPL-MCNC: 30 MG/DL (ref 6–20)
BUN/CREAT SERPL: 26 (ref 12–20)
CALCIUM SERPL-MCNC: 8.4 MG/DL (ref 8.5–10.1)
CHLORIDE SERPL-SCNC: 106 MMOL/L (ref 97–108)
CO2 SERPL-SCNC: 24 MMOL/L (ref 21–32)
CREAT SERPL-MCNC: 1.16 MG/DL (ref 0.7–1.3)
ERYTHROCYTE [DISTWIDTH] IN BLOOD BY AUTOMATED COUNT: 15.4 % (ref 11.5–14.5)
GLOBULIN SER CALC-MCNC: 3.8 G/DL (ref 2–4)
GLUCOSE SERPL-MCNC: 96 MG/DL (ref 65–100)
HCT VFR BLD AUTO: 41.3 % (ref 36.6–50.3)
HGB BLD-MCNC: 13.7 G/DL (ref 12.1–17)
LACTATE SERPL-SCNC: 0.9 MMOL/L (ref 0.4–2)
LIPASE SERPL-CCNC: 163 U/L (ref 73–393)
MCH RBC QN AUTO: 29.5 PG (ref 26–34)
MCHC RBC AUTO-ENTMCNC: 33.2 G/DL (ref 30–36.5)
MCV RBC AUTO: 88.8 FL (ref 80–99)
PLATELET # BLD AUTO: 219 K/UL (ref 150–400)
POTASSIUM SERPL-SCNC: 4.5 MMOL/L (ref 3.5–5.1)
PROT SERPL-MCNC: 7 G/DL (ref 6.4–8.2)
RBC # BLD AUTO: 4.65 M/UL (ref 4.1–5.7)
SODIUM SERPL-SCNC: 135 MMOL/L (ref 136–145)
WBC # BLD AUTO: 9.8 K/UL (ref 4.1–11.1)

## 2017-08-17 PROCEDURE — 74011250637 HC RX REV CODE- 250/637: Performed by: FAMILY MEDICINE

## 2017-08-17 PROCEDURE — 83605 ASSAY OF LACTIC ACID: CPT | Performed by: FAMILY MEDICINE

## 2017-08-17 PROCEDURE — 99218 HC RM OBSERVATION: CPT

## 2017-08-17 PROCEDURE — A9537 TC99M MEBROFENIN: HCPCS

## 2017-08-17 PROCEDURE — 77030027138 HC INCENT SPIROMETER -A

## 2017-08-17 PROCEDURE — 74011000258 HC RX REV CODE- 258: Performed by: FAMILY MEDICINE

## 2017-08-17 PROCEDURE — 83690 ASSAY OF LIPASE: CPT | Performed by: FAMILY MEDICINE

## 2017-08-17 PROCEDURE — 80053 COMPREHEN METABOLIC PANEL: CPT | Performed by: FAMILY MEDICINE

## 2017-08-17 PROCEDURE — 85027 COMPLETE CBC AUTOMATED: CPT | Performed by: FAMILY MEDICINE

## 2017-08-17 PROCEDURE — 74011250636 HC RX REV CODE- 250/636: Performed by: FAMILY MEDICINE

## 2017-08-17 PROCEDURE — 36415 COLL VENOUS BLD VENIPUNCTURE: CPT | Performed by: FAMILY MEDICINE

## 2017-08-17 RX ORDER — HYDROCODONE BITARTRATE AND ACETAMINOPHEN 5; 325 MG/1; MG/1
1-2 TABLET ORAL
Qty: 20 TAB | Refills: 0 | Status: SHIPPED | OUTPATIENT
Start: 2017-08-17 | End: 2017-08-24 | Stop reason: SDUPTHER

## 2017-08-17 RX ORDER — ONDANSETRON 4 MG/1
4 TABLET, ORALLY DISINTEGRATING ORAL
Qty: 10 TAB | Refills: 0 | Status: SHIPPED | OUTPATIENT
Start: 2017-08-17

## 2017-08-17 RX ORDER — HYDROCODONE BITARTRATE AND ACETAMINOPHEN 5; 325 MG/1; MG/1
1-2 TABLET ORAL
Status: DISCONTINUED | OUTPATIENT
Start: 2017-08-17 | End: 2017-08-17 | Stop reason: HOSPADM

## 2017-08-17 RX ORDER — ONDANSETRON 4 MG/1
4 TABLET, ORALLY DISINTEGRATING ORAL
Status: DISCONTINUED | OUTPATIENT
Start: 2017-08-17 | End: 2017-08-17 | Stop reason: HOSPADM

## 2017-08-17 RX ADMIN — PIPERACILLIN SODIUM,TAZOBACTAM SODIUM 3.38 G: 3; .375 INJECTION, POWDER, FOR SOLUTION INTRAVENOUS at 13:18

## 2017-08-17 RX ADMIN — Medication 10 ML: at 13:18

## 2017-08-17 RX ADMIN — ONDANSETRON 4 MG: 2 INJECTION INTRAMUSCULAR; INTRAVENOUS at 04:46

## 2017-08-17 RX ADMIN — SODIUM CHLORIDE 125 ML/HR: 900 INJECTION, SOLUTION INTRAVENOUS at 03:11

## 2017-08-17 RX ADMIN — SINCALIDE 1.32 MCG: 5 INJECTION, POWDER, LYOPHILIZED, FOR SOLUTION INTRAVENOUS at 13:00

## 2017-08-17 RX ADMIN — PIPERACILLIN SODIUM,TAZOBACTAM SODIUM 3.38 G: 3; .375 INJECTION, POWDER, FOR SOLUTION INTRAVENOUS at 05:47

## 2017-08-17 RX ADMIN — HYDROMORPHONE HYDROCHLORIDE 1 MG: 1 INJECTION, SOLUTION INTRAMUSCULAR; INTRAVENOUS; SUBCUTANEOUS at 04:43

## 2017-08-17 RX ADMIN — HYDROMORPHONE HYDROCHLORIDE 1 MG: 1 INJECTION, SOLUTION INTRAMUSCULAR; INTRAVENOUS; SUBCUTANEOUS at 13:18

## 2017-08-17 RX ADMIN — HYDROMORPHONE HYDROCHLORIDE 1 MG: 1 INJECTION, SOLUTION INTRAMUSCULAR; INTRAVENOUS; SUBCUTANEOUS at 00:40

## 2017-08-17 RX ADMIN — Medication 10 ML: at 05:47

## 2017-08-17 RX ADMIN — SODIUM CHLORIDE 1000 ML: 900 INJECTION, SOLUTION INTRAVENOUS at 00:09

## 2017-08-17 RX ADMIN — PIPERACILLIN SODIUM,TAZOBACTAM SODIUM 3.38 G: 3; .375 INJECTION, POWDER, FOR SOLUTION INTRAVENOUS at 00:07

## 2017-08-17 RX ADMIN — HYDROCODONE BITARTRATE AND ACETAMINOPHEN 2 TABLET: 5; 325 TABLET ORAL at 16:02

## 2017-08-17 RX ADMIN — Medication 10 ML: at 04:43

## 2017-08-17 NOTE — DISCHARGE INSTRUCTIONS
Learning About Gallstones  What are gallstones? Gallstones are stones that form in the gallbladder. The gallbladder is a small sac located just under the liver. It stores bile released by the liver. Bile helps you digest fats. Gallstones can be smaller than a grain of sand or as large as a golf ball. Gallstones form when cholesterol and other things found in bile make stones. They can also form if the gallbladder doesn't empty as it should. Gallstones can also form in the common bile duct or cystic duct. These tubes carry bile from the gallbladder and the liver to the small intestine. What happens when you have gallstones? Gallstones can cause many different problems, such as:  · A blockage in the common bile duct. · Inflammation or infection of the gallbladder (acute cholecystitis). This can happen when a gallstone blocks the cystic duct. · Inflammation or infection of the common bile duct (cholangitis). This can happen when gallstones get stuck in the common bile duct. In rare cases, this can damage the liver or spread infection. · Pancreatitis, an inflammation of the pancreas. What are the symptoms? · Most people who have gallstones don't have symptoms. · Symptoms can include:  ¨ Mild pain in the pit of your stomach or in the upper right part of your belly. It may spread to your right upper back or shoulder blade area. ¨ Severe pain that may come and go or be steady. It may get worse when you eat. ¨ Fever and chills, if a gallstone is blocking a bile duct and causing an infection. ¨ Yellowing of your skin and the whites of your eyes. How can you prevent gallstones? There is no sure way to prevent gallstones. But you can reduce your risk of forming gallstones that can cause symptoms. · Stay at a healthy weight. If you need to lose weight, do so slowly and sensibly. · Eat regular, balanced meals. · Be active, and exercise regularly. How are gallstones treated?   · If you don't have symptoms, you probably don't need treatment. · For mild symptoms, your doctor may have you take pain medicine and wait to see if the pain goes away. · For severe pain or infection, or if you have more than one gallstone attack, your doctor may suggest surgery to have your gallbladder removed. The body works fine without a gallbladder. Follow-up care is a key part of your treatment and safety. Be sure to make and go to all appointments, and call your doctor if you are having problems. It's also a good idea to know your test results and keep a list of the medicines you take. Where can you learn more? Go to http://alberta-yogesh.info/. Enter  in the search box to learn more about \"Learning About Gallstones. \"  Current as of: August 9, 2016  Content Version: 11.3  © 8426-6040 [employer], Inc[employer]. Care instructions adapted under license by [employer] (which disclaims liability or warranty for this information). If you have questions about a medical condition or this instruction, always ask your healthcare professional. [address] any warranty or liability for your use of this information.

## 2017-08-17 NOTE — DISCHARGE SUMMARY
Physician Discharge Summary     Patient ID:  Kerline Perez  [id number]  14 y.o.  1965    Admit Date: 8/16/2017    Discharge Date: 8/17/2017    Admission Diagnoses: RUQ pain    Discharge Diagnoses: Active Problems:    RUQ pain (8/16/2017)         Admission Condition: [address]    Discharge Condition: Good    Last Procedure: * No surgery found [facility] Course:   Normal hospital course for this condition. HIDA scan shows: \"IMPRESSION:  1. Normal Gallbladder Filling. No Biliary Obstruction. 2. Abnormal Gallbladder EF. \"  Vicki po challenge. Consults: None    Disposition: home    Patient Instructions:   Current Discharge Medication List      START taking these medications    Details   HYDROcodone-acetaminophen (NORCO) 5-325 mg per tablet Take 1-2 Tabs by mouth every four (4) hours as needed. Max Daily Amount: 12 Tabs. Qty: 20 Tab, Refills: 0      ondansetron (ZOFRAN ODT) 4 mg disintegrating tablet Take 1 Tab by mouth every eight (8) hours as needed. Qty: 10 Tab, Refills: 0         CONTINUE these medications which have NOT CHANGED    Details   doxycycline (VIBRAMYCIN) 100 mg capsule Take 1 Cap by mouth two (2) times a day. Qty: 14 Cap, Refills: 0           Activity: Activity as tolerated  Diet: Low fat, Low cholesterol  Wound Care: None needed    Follow-up with [facility] in the next  1 week.       Signed:  Crispin Pruitt MD  [facility]  8/17/2017  6:05 PM

## 2017-08-17 NOTE — H&P
Surgery History and Physical    Subjective:      Cali Biswas is a 47 y.o. male who presents for evaluation of RUQ pain, and vomiting. The pain is described as intermittent with 2 specific episodes and is 10/10 in intensity. Onset was 7 hours ago. Symptoms have been variable and now resolved. Aggravating factors: po intake, vomiting. US shows: \"The gallbladder contains sludge is  distended and tender on the real time. The common bile duct is dilated a 10 mm. There is some mild intrahepatic duct dilatation. Portal vein flow is  hepatopedal.     WBC 14.6 on antibx for oral cavity infection    History reviewed. No pertinent past medical history. History reviewed. No pertinent surgical history. History reviewed. No pertinent family history. Social History   Substance Use Topics    Smoking status: Current Every Day Smoker     Packs/day: 0.50    Smokeless tobacco: Never Used    Alcohol use No      Comment: hx of daily EtOH, none for the past 6 months as of 2017      Prior to Admission medications    Medication Sig Start Date End Date Taking? Authorizing Provider   doxycycline (VIBRAMYCIN) 100 mg capsule Take 1 Cap by mouth two (2) times a day.  17   Albaro Galvez, DO      No Known Allergies    Review of Systemssee HPI/PMH    Objective:     Patient Vitals for the past 8 hrs:   BP Temp Pulse Resp SpO2 Height Weight   17 2230 109/85 - - - 96 % - -   17 2200 104/81 - - - 97 % - -   17 2130 105/76 - - - 97 % - -   17 2117 - - - - 95 % - -   17 2116 119/81 - - - - - -   17 2030 107/83 - - - - - -   17 114/87 - - - - - -   17 1936 - - - - 99 % - -   17 1915 - - - - 97 % - -   17 1824 (!) 118/91 97.6 °F (36.4 °C) (!) 103 16 99 % 5' 8\" (1.727 m) 145 lb (65.8 kg)       Temp (24hrs), Av.6 °F (36.4 °C), Min:97.6 °F (36.4 °C), Max:97.6 °F (36.4 °C)      Physical Exam A&Ox3, NAD  NC/AT  PERR,EOMI, sclera not icteric  Nares patent  Oral mucosa moist  Trach midline  Nl Resp effort  RRR  Abd soft, +BS some tenderness epigastrium to RUQ, no guarding or rebound, neg Whitfield's  Ext wnl  Skin warm and dry      Assessment:     RUQ pain  Nausea and vomiting  Distended GB with sludge    Plan:     Admit to observation  NPO  Labs in AM  HIDA Scan in AM  IV fluids, pain med, antiemetics,antibx  IV antibx    Signed By: Sedrick Yoder MD   [facility]    August 16, 2017

## 2017-08-17 NOTE — PROGRESS NOTES
Pharmacy Automatic Renal Dosing Protocol - Antimicrobials    Indication for Antimicrobials: Intraabdominal Infections  Current Regimen of Each Antimicrobial (Start Day & Day of Therapy):  Zosyn 3.375 gram iv q6h  Significant cultures: Body fluid culture     CAPD, Intermittent Hemodialysis or Renal Replacement Therapy: no  Paralysis, amputations, malnutrition: no  Recent Labs      17   1850   CREA  1.28   BUN  35*   WBC  14.6*     Temp (24hrs), Av.6 °F (36.4 °C), Min:97.6 °F (36.4 °C), Max:97.6 °F (36.4 °C)    Creatinine Clearance (ml/min): 63.5        Impression/Plan: Zosyn dosing adjusted to 3.375 gram iv q8h (each dose over 4 hours) as per protocol Catina Erazo, Linda1 Tristan RUANO will follow daily and adjust doses of monitored medications as appropriate for renal function and/or serum levels. Thank you,  Catina Erazo PHARMMIR           Loading dose entered? Yes   Daily BMP ordered? Yes   Maintenance dose entered? Yes   Previous order discontinued? Yes   Progress note entered? Yes   Serum Level(s) ordered?  Not applicable   Vancomycin note as daily (not prn) as a reminder for the RN to give the Vancomycin after HD        Renal Dosing Tables on the Pharmacy Web Site

## 2017-08-17 NOTE — PROGRESS NOTES
Initial Nutrition Assessment:    INTERVENTIONS/RECOMMENDATIONS:   · Meals/Snacks: General/healthful diet:  Continue GI Lite but will trial clear liquids tonight for dinner since pt had some difficulty tolerating solids today at lunch. · Supplements: Commercial supplement:  Ensure Clear with meals BID. ASSESSMENT:   Chart reviewed; med noted for RUQ pain. S/P gallbladder studies. Pt reports immediate pain and nausea after eating small chicken breast and a few bites of whipped potatoes. Pt reports that he is unclear regarding plan or care vs discharge. It appears that discharge is dependant up po tolerance. Will try liquids tonight with Ensure Clear. Encouraged pt to reports +/- po tolerance and symptoms to nurse and MD.      Diet Order:  (GI Lite)  % Eaten:  Patient Vitals for the past 72 hrs:   % Diet Eaten   08/17/17 1324 40 %     Pertinent Medications: [x]Reviewed []Other  Pertinent Labs: [x]Reviewed []Other  Food Allergies: [x]None []Other   Last BM:    [x]Active     []Hyperactive  []Hypoactive       [] Absent BS  Skin:    [x] Intact   [] Incision  [] Breakdown  [] Other:    Anthropometrics:   Height: 5' 8\" (172.7 cm) Weight: 65.8 kg (145 lb)   IBW (%IBW):   ( ) UBW (%UBW):   (  %)   Last Weight Metrics:  Weight Loss Metrics 8/16/2017 7/31/2017 6/15/2017   Today's Wt 145 lb 141 lb 8.6 oz 142 lb 3.2 oz   BMI 22.05 kg/m2 22.17 kg/m2 21.62 kg/m2       BMI: Body mass index is 22.05 kg/(m^2). This BMI is indicative of:   []Underweight    [x]Normal    []Overweight    [] Obesity   [] Extreme Obesity (BMI>40)     Estimated Nutrition Needs (Based on):   1935 Kcals/day (BMR (1489) x 1. 3AF) , 66 g (1.0 g/kg bw) Protein  Carbohydrate:  At Least 130 g/day  Fluids: 1900 mL/day (1ml/kcal)    NUTRITION DIAGNOSES:   Problem:  Altered GI function      Etiology: related to gallbladder concerns     Signs/Symptoms: as evidenced by weight loss and poor meal tolerance, nausea and pain      NUTRITION INTERVENTIONS:  Meals/Snacks: General/healthful diet   Supplements: Commercial supplement              GOAL:   Pt will be able to tolerate liquids + Ensure Clear without any pain or nausea next 2-4 days    LEARNING NEEDS (Diet, Food/Nutrient-Drug Interaction):    [x] None Identified   [] Identified and Education Provided/Documented   [] Identified and Pt declined/was not appropriate     Cultureal, Restoration, OR Ethnic Dietary Needs:    [x] None Identified   [] Identified and Addressed     [x] Interdisciplinary Care Plan Reviewed/Documented    [x] Discharge Planning:  Pending the need for cholecystectomy       MONITORING /EVALUATION:      Food/Nutrient Intake Outcomes:  Total energy intake  Physical Signs/Symptoms Outcomes: Weight/weight change, GI profile    NUTRITION RISK:    [x] High              [] Moderate           []  Low  []  Minimal/Uncompromised    PT SEEN FOR:    []  MD Consult: []Calorie Count      []Diabetic Diet Education        []Diet Education     []Electrolyte Management     []General Nutrition Management and Supplements     []Management of Tube Feeding     []TPN Recommendations    [x]  RN Referral:  [x]MST score >=2     []Enteral/Parenteral Nutrition PTA     []Pregnant: Gestational DM or Multigestation     []Pressure Ulcer/Wound Care needs        []  Low BMI  []  DTR Referral       Bianca Brody, [address] [place] [address]  Pager [phone]  Weekend Pager [phone]

## 2017-08-17 NOTE — PROGRESS NOTES
8/17/2017  04:18 AM    TRANSFER - IN REPORT:    Verbal report [facility] from [facility], RN(name) on Narinder Glasgow  being received from ED(unit) for routine progression of care      Report consisted of patients Situation, Background, Assessment and   Recommendations(SBAR). Information from the following report(s) SBAR, Kardex, ED Summary, Procedure Summary, Intake/Output, MAR, Accordion, Recent Results and Med Rec Status was reviewed with the receiving nurse. Opportunity for questions and clarification was provided. Assessment completed upon patients arrival to unit and care assumed.            Primary Nurse Wendy Perez and [place], RN performed a dual skin assessment on this patient No impairment noted  Kirt score is 20

## 2017-08-24 ENCOUNTER — OFFICE VISIT (OUTPATIENT)
Dept: SURGERY | Age: 52
End: 2017-08-24

## 2017-08-24 VITALS
WEIGHT: 144.4 LBS | HEIGHT: 68 IN | BODY MASS INDEX: 21.89 KG/M2 | DIASTOLIC BLOOD PRESSURE: 80 MMHG | OXYGEN SATURATION: 99 % | SYSTOLIC BLOOD PRESSURE: 153 MMHG | HEART RATE: 80 BPM

## 2017-08-24 DIAGNOSIS — K82.8 BILIARY DYSKINESIA: ICD-10-CM

## 2017-08-24 DIAGNOSIS — K82.8 GALLBLADDER SLUDGE: Primary | ICD-10-CM

## 2017-08-24 PROBLEM — J18.9 COMMUNITY ACQUIRED PNEUMONIA: Status: RESOLVED | Noted: 2017-06-15 | Resolved: 2017-08-24

## 2017-08-24 PROBLEM — J96.01 ACUTE RESPIRATORY FAILURE WITH HYPOXIA (HCC): Status: RESOLVED | Noted: 2017-06-15 | Resolved: 2017-08-24

## 2017-08-24 PROBLEM — A41.9 SEPSIS (HCC): Status: RESOLVED | Noted: 2017-06-15 | Resolved: 2017-08-24

## 2017-08-24 RX ORDER — HYDROCODONE BITARTRATE AND ACETAMINOPHEN 5; 325 MG/1; MG/1
1-2 TABLET ORAL
Qty: 20 TAB | Refills: 0 | Status: SHIPPED | OUTPATIENT
Start: 2017-08-24

## 2017-08-24 NOTE — MR AVS SNAPSHOT
Visit Information Date & Time Provider Department Dept. Phone Encounter #  
 8/24/2017  3:20 PM Gerardo Jack MD [facility] [address] [phone] [id number] Your Appointments 9/13/2017  1:00 PM  
POST OP with Gerardo Jack MD  
[facility] [address] ([facility]) Appt Note: po lap kyung 8/29 [facility] [place] [address], [address] [facility], [address] [phone]  
[address], [address] [facility], [address] [facility] [address] [phone] Upcoming Health Maintenance Date Due DTaP/Tdap/Td series (1 - Tdap) 1/5/1987 FOBT Q 1 YEAR AGE 50-75 1/5/2016 INFLUENZA AGE 9 TO ADULT 8/1/2017 Allergies as of 8/24/2017  Review Complete On: 8/24/2017 By: Gerardo Jack MD  
 No Known Allergies Current Immunizations  Never Reviewed No immunizations on file. Not reviewed this visit You Were Diagnosed With   
  
 Codes Comments Gallbladder sludge    -  Primary ICD-10-CM: K82.8 ICD-9-CM: 575.8 Biliary dyskinesia     ICD-10-CM: K82.8 ICD-9-CM: 575.8 Vitals BP Pulse Height(growth percentile) Weight(growth percentile) SpO2 BMI  
 153/80 (BP 1 Location: Right arm, BP Patient Position: Sitting) 80 5' 8\" (1.727 m) 144 lb 6.4 oz (65.5 kg) 99% 21.96 kg/m2 Smoking Status Former Smoker Vitals History BMI and BSA Data Body Mass Index Body Surface Area  
 21.96 kg/m 2 1.77 m 2 Your Updated Medication List  
  
   
This list is accurate as of: 8/24/17  4:29 PM.  Always use your most recent med list.  
  
  
  
  
 doxycycline 100 mg capsule Commonly known as:  VIBRAMYCIN Take 1 Cap by mouth two (2) times a day. HYDROcodone-acetaminophen 5-325 mg per tablet Commonly known as:  Rhenda Lucas Take 1-2 Tabs by mouth every four (4) hours as needed. Max Daily Amount: 12 Tabs. Indications: Pain ondansetron 4 mg disintegrating tablet Commonly known as:  ZOFRAN ODT Take 1 Tab by mouth every eight (8) hours as needed. Prescriptions Printed Refills HYDROcodone-acetaminophen (NORCO) 5-325 mg per tablet 0 Sig: Take 1-2 Tabs by mouth every four (4) hours as needed. Max Daily Amount: 12 Tabs. Indications: Pain Class: Print Route: Oral  
  
Introducing [facility] & [facility]! Aleisha Hernandez introduces [employer] patient portal. Now you can access parts of your medical record, email your doctor's office, and request medication refills online. 1. In your internet browser, go to [email or web address] 2. Click on the First Time User? Click Here link in the Sign In box. You will see the New Member Sign Up page. 3. Enter your [employer] Access Code exactly as it appears below. You will not need to use this code after youve completed the sign-up process. If you do not sign up before the expiration date, you must request a new code. · [employer] Access Code: [email or web address] Expires: 9/20/2017 12:32 PM 
 
4. Enter the last four digits of your Social Security Number (xxxx) and Date of Birth (mm/dd/yyyy) as indicated and click Submit. You will be taken to the next sign-up page. 5. Create a [employer] ID. This will be your [employer] login ID and cannot be changed, so think of one that is secure and easy to remember. 6. Create a [employer] password. You can change your password at any time. 7. Enter your Password Reset Question and Answer. This can be used at a later time if you forget your password. 8. Enter your e-mail address. You will receive e-mail notification when new information is available in [address] 9. Click Sign Up. You can now view and download portions of your medical record. 10. Click the Download Summary menu link to download a portable copy of your medical information.  
 
If you have questions, please visit the Frequently Asked Questions section of the [email or web address]. Remember, [employer] is NOT to be used for urgent needs. For medical emergencies, dial 911. Now available from your iPhone and Android! Please provide this summary of care documentation to your next provider. Your primary care clinician is listed as NONE. If you have any questions after today's visit, please call [phone].

## 2017-08-24 NOTE — PROGRESS NOTES
The patient is a 47 y.o. man who had been admitted to the hospital recently because of right upper quadrant pain. He was found at that time by ultrasound to have sludge in the gallbladder. HIDA scan showed filling of the gallbladder but showed a zero % EF. The patient reports he continues to have pain on a daily basis in the right upper quadrant and epigastrium. It is particularly triggered by eating with fatty foods being worse. He says he is eating only small amounts now because of that. He has lost weight because of decreased intake, he believes. He also said he was treated for pneumonia several months ago and is still somewhat weak after that. The patient reports that he had quit smoking but is now smoking an occasional small cigar. He quit drinking alcohol in approximately January of this year and said before that he was a fairly heavy drinker. The patient had used marijuana in the past but says he does not use any drugs at this time. The patient presently denies anginal chest pain or irregular heart beat. There is no history of coronary intervention. The patient does not have any shortness of breath at present. He has no history of diabetes. There is no history of previous abdominal surgery. Physical Examination:   GENERAL:  Alert man in no acute distress. Visit Vitals    /80 (BP 1 Location: Right arm, BP Patient Position: Sitting)    Pulse 80    Ht 5' 8\" (1.727 m)    Wt 65.5 kg (144 lb 6.4 oz)    SpO2 99%    BMI 21.96 kg/m2   HEAD/NECK:  No jaundice, mass or thyromegaly. LUNGS:  Clear bilaterally without wheeze, rhonchi or rales. Michaelyn Daily HEART:  Regular without murmur, gallop or rub. ABDOMEN: Soft, flat with mild right upper quadrant tenderness. No mass is palpated. No hernias are noted. EXTREMITIES:  No edema. NEURO:  Patient is alert and oriented. He moves all extremities equally.   Facial movement was symmetrical. Speech was normal.       I reviewed the patient's lab studies done recently. Liver function tests are normal. EKG is normal.    I would recommend proceeding with  laparoscopic cholecystectomy with possible open cholecystectomy and possible cholangiogram.  The typical operative and post operative courses were reviewed with the patient. Potential side effects of removal of the gallbladder were reviewed. Risks versus benefits were reviewed with the patient. Risks would include bleeding, infection, injury to abdominal organs, injury to common bile duct, bile leak, failure to resolve symptoms, potential need for open surgery, risk of general anesthesia, etc. The patient understands and wishes to proceed. Final Diagnosis:  Gallbladder sludge, biliary dyskinesia.      MedDATA/gwo

## 2017-08-28 NOTE — PERIOP NOTES
[address]   Preoperative Instructions        Surgery Date 08/29/17          Time of Arrival 1000    1. On the day of your surgery, please report to the Surgical Services Registration Desk and sign in at your designated time. The Surgery Center is located to the right of the Emergency Room. 2. You must have someone with you to drive you home. You should not drive a car for 24 hours following surgery. Please make arrangements for a friend or family member to stay with you for the first 24 hours after your surgery. 3. Do not have anything to eat or drink (including water, gum, mints, coffee, juice) after midnight ?08/28/17? Benna Him ? This may not apply to medications prescribed by your physician. ?(Please note below the special instructions with medications to take the morning of your procedure.)    4. We recommend you do not drink any alcoholic beverages for 24 hours before and after your surgery. 5. Stop all Aspirin, non-steroidal anti-inflammatory drugs (i.e. Advil, Aleve), vitamins, and supplements?as directed by your surgeon's office. ? **If you are currently taking Plavix, Coumadin, or other blood-thinning agents, contact your surgeon for instructions. **    6. Wear comfortable clothes. Wear glasses instead of contacts. Do not bring any money or jewelry. Please bring picture ID, insurance card, and any prearranged co-payment or hospital payment. Do not wear make-up, particularly mascara the morning of your surgery. Do not wear nail polish, particularly if you are having foot /hand surgery. Wear your hair loose or down, no ponytails, buns, marya pins or clips. All body piercings must be removed. Please shower with antibacterial soap for three consecutive days before and on the morning of surgery, but do not apply any lotions, powders or deodorants after the shower on the day of surgery. Please use a fresh towels after each shower.  Please sleep in clean clothes and change bed linens the night before surgery. Please do not shave for 48 hours prior to surgery. Shaving of the face is acceptable. 7. You should understand that if you do not follow these instructions your surgery may be cancelled. If your physical condition changes (I.e. fever, cold or flu) please contact your surgeon as soon as possible. 8. It is important that you be on time. If a situation occurs where you may be late, please call [phone] (OR Holding Area). 9. If you have any questions and or problems, please call [phone] (Pre-admission Testing). 10. Your surgery time may be subject to change. You will receive a phone call the evening prior if your time changes. 11.  If having outpatient surgery, you must have someone to drive you here, stay with you during the duration of your stay, and to drive you home at time of discharge. 12.   In an effort to improve the efficiency, privacy, and safety for all of our Pre-op patients visitors are not allowed in the Holding area. Once you arrive and are registered your family/visitors will be asked to remain in the waiting room. The Pre-op staff will get you from the Surgical Waiting Area and will explain to you and your family/visitors that the Pre-op phase is beginning. The staff will answer any questions and provide instructions for tracking of the patient, by use of the existing tracking number and color-coded status board in the waiting room. At this time the staff will also ask for your designated spokesperson information in the event that the physician or staff need to provide an update or obtain any pertinent information. The designated spokesperson will be notified if the physician needs to speak to family during the pre-operative phase. If at any time your family/visitors has questions or concerns they may approach the volunteer desk in the waiting area for assistance.          Special Instructions:    MEDICATIONS TO TAKE THE MORNING OF SURGERY WITH A SIP OF WATER: none      I understand a pre-operative phone call will be made to verify my surgery time. In the event that I am not available, I give permission for a message to be left on my answering service and/or with another person?   [phone]   ___________________      __________   _________    (Signature of Patient)             (Witness)                (Date and Time)

## 2017-08-29 ENCOUNTER — HOSPITAL ENCOUNTER (OUTPATIENT)
Age: 52
Setting detail: OUTPATIENT SURGERY
Discharge: HOME | End: 2017-08-29
Attending: SURGERY | Admitting: SURGERY
Payer: SELF-PAY

## 2017-08-29 ENCOUNTER — ANESTHESIA (OUTPATIENT)
Dept: SURGERY | Age: 52
End: 2017-08-29
Payer: SELF-PAY

## 2017-08-29 ENCOUNTER — ANESTHESIA EVENT (OUTPATIENT)
Dept: SURGERY | Age: 52
End: 2017-08-29
Payer: SELF-PAY

## 2017-08-29 ENCOUNTER — APPOINTMENT (OUTPATIENT)
Dept: GENERAL RADIOLOGY | Age: 52
End: 2017-08-29
Attending: SURGERY
Payer: SELF-PAY

## 2017-08-29 VITALS
SYSTOLIC BLOOD PRESSURE: 124 MMHG | HEIGHT: 68 IN | TEMPERATURE: 97.6 F | OXYGEN SATURATION: 100 % | DIASTOLIC BLOOD PRESSURE: 79 MMHG | HEART RATE: 71 BPM | BODY MASS INDEX: 22.25 KG/M2 | RESPIRATION RATE: 16 BRPM | WEIGHT: 146.83 LBS

## 2017-08-29 PROCEDURE — 74011636320 HC RX REV CODE- 636/320

## 2017-08-29 PROCEDURE — 77030031139 HC SUT VCRL2 J&J -A: Performed by: SURGERY

## 2017-08-29 PROCEDURE — 77030009932 HC PRB FT CTRL J&J -B: Performed by: SURGERY

## 2017-08-29 PROCEDURE — 74011250636 HC RX REV CODE- 250/636

## 2017-08-29 PROCEDURE — 77030035045 HC TRCR ENDOSC VRSPRT BLDLSS COVD -B: Performed by: SURGERY

## 2017-08-29 PROCEDURE — 77030012022 HC APPL CLP ENDOSC COVD -C: Performed by: SURGERY

## 2017-08-29 PROCEDURE — 77030010507 HC ADH SKN DERMBND J&J -B: Performed by: SURGERY

## 2017-08-29 PROCEDURE — 77030035051: Performed by: SURGERY

## 2017-08-29 PROCEDURE — 76010000131 HC OR TIME 2 TO 2.5 HR: Performed by: SURGERY

## 2017-08-29 PROCEDURE — 76060000035 HC ANESTHESIA 2 TO 2.5 HR: Performed by: SURGERY

## 2017-08-29 PROCEDURE — 77030002916 HC SUT ETHLN J&J -A: Performed by: SURGERY

## 2017-08-29 PROCEDURE — 77030020263 HC SOL INJ SOD CL0.9% LFCR 1000ML: Performed by: SURGERY

## 2017-08-29 PROCEDURE — 77030008566 HC TBNG SUC IRR J&J -B: Performed by: SURGERY

## 2017-08-29 PROCEDURE — 77030011280 HC ELECTRD MPLR J&J -B: Performed by: SURGERY

## 2017-08-29 PROCEDURE — 74011250636 HC RX REV CODE- 250/636: Performed by: ANESTHESIOLOGY

## 2017-08-29 PROCEDURE — 77030008684 HC TU ET CUF COVD -B: Performed by: ANESTHESIOLOGY

## 2017-08-29 PROCEDURE — 74011000250 HC RX REV CODE- 250

## 2017-08-29 PROCEDURE — 76210000016 HC OR PH I REC 1 TO 1.5 HR: Performed by: SURGERY

## 2017-08-29 PROCEDURE — 77030035048 HC TRCR ENDOSC OPTCL COVD -B: Performed by: SURGERY

## 2017-08-29 PROCEDURE — 88304 TISSUE EXAM BY PATHOLOGIST: CPT | Performed by: SURGERY

## 2017-08-29 PROCEDURE — 74011000250 HC RX REV CODE- 250: Performed by: SURGERY

## 2017-08-29 PROCEDURE — 77030020782 HC GWN BAIR PAWS FLX 3M -B

## 2017-08-29 PROCEDURE — 74300 X-RAY BILE DUCTS/PANCREAS: CPT

## 2017-08-29 PROCEDURE — 74011636320 HC RX REV CODE- 636/320: Performed by: SURGERY

## 2017-08-29 PROCEDURE — 77030011241 HC DRN WND FLL CARD -B: Performed by: SURGERY

## 2017-08-29 PROCEDURE — 76210000022 HC REC RM PH II 1.5 TO 2 HR: Performed by: SURGERY

## 2017-08-29 PROCEDURE — 77030011640 HC PAD GRND REM COVD -A: Performed by: SURGERY

## 2017-08-29 PROCEDURE — 77030009852 HC PCH RTVR ENDOSC COVD -B: Performed by: SURGERY

## 2017-08-29 PROCEDURE — 77030026438 HC STYL ET INTUB CARD -A: Performed by: ANESTHESIOLOGY

## 2017-08-29 PROCEDURE — 77030032490 HC SLV COMPR SCD KNE COVD -B: Performed by: SURGERY

## 2017-08-29 RX ORDER — FENTANYL CITRATE 50 UG/ML
INJECTION, SOLUTION INTRAMUSCULAR; INTRAVENOUS AS NEEDED
Status: DISCONTINUED | OUTPATIENT
Start: 2017-08-29 | End: 2017-08-29 | Stop reason: HOSPADM

## 2017-08-29 RX ORDER — SODIUM CHLORIDE 0.9 % (FLUSH) 0.9 %
5-10 SYRINGE (ML) INJECTION AS NEEDED
Status: CANCELLED | OUTPATIENT
Start: 2017-08-29

## 2017-08-29 RX ORDER — SODIUM CHLORIDE 0.9 % (FLUSH) 0.9 %
5-10 SYRINGE (ML) INJECTION AS NEEDED
Status: DISCONTINUED | OUTPATIENT
Start: 2017-08-29 | End: 2017-08-29 | Stop reason: HOSPADM

## 2017-08-29 RX ORDER — BUPIVACAINE HYDROCHLORIDE AND EPINEPHRINE 2.5; 5 MG/ML; UG/ML
INJECTION, SOLUTION EPIDURAL; INFILTRATION; INTRACAUDAL; PERINEURAL AS NEEDED
Status: DISCONTINUED | OUTPATIENT
Start: 2017-08-29 | End: 2017-08-29 | Stop reason: HOSPADM

## 2017-08-29 RX ORDER — OXYCODONE AND ACETAMINOPHEN 5; 325 MG/1; MG/1
1-2 TABLET ORAL
Qty: 30 TAB | Refills: 0 | Status: SHIPPED | OUTPATIENT
Start: 2017-08-29

## 2017-08-29 RX ORDER — SODIUM CHLORIDE, SODIUM LACTATE, POTASSIUM CHLORIDE, CALCIUM CHLORIDE 600; 310; 30; 20 MG/100ML; MG/100ML; MG/100ML; MG/100ML
25 INJECTION, SOLUTION INTRAVENOUS CONTINUOUS
Status: DISCONTINUED | OUTPATIENT
Start: 2017-08-29 | End: 2017-08-29 | Stop reason: HOSPADM

## 2017-08-29 RX ORDER — SODIUM CHLORIDE, SODIUM LACTATE, POTASSIUM CHLORIDE, CALCIUM CHLORIDE 600; 310; 30; 20 MG/100ML; MG/100ML; MG/100ML; MG/100ML
25 INJECTION, SOLUTION INTRAVENOUS CONTINUOUS
Status: CANCELLED | OUTPATIENT
Start: 2017-08-29 | End: 2017-08-30

## 2017-08-29 RX ORDER — ROCURONIUM BROMIDE 10 MG/ML
INJECTION, SOLUTION INTRAVENOUS AS NEEDED
Status: DISCONTINUED | OUTPATIENT
Start: 2017-08-29 | End: 2017-08-29 | Stop reason: HOSPADM

## 2017-08-29 RX ORDER — PHENYLEPHRINE HCL IN 0.9% NACL 0.4MG/10ML
SYRINGE (ML) INTRAVENOUS AS NEEDED
Status: DISCONTINUED | OUTPATIENT
Start: 2017-08-29 | End: 2017-08-29 | Stop reason: HOSPADM

## 2017-08-29 RX ORDER — LIDOCAINE HYDROCHLORIDE 20 MG/ML
INJECTION, SOLUTION EPIDURAL; INFILTRATION; INTRACAUDAL; PERINEURAL AS NEEDED
Status: DISCONTINUED | OUTPATIENT
Start: 2017-08-29 | End: 2017-08-29 | Stop reason: HOSPADM

## 2017-08-29 RX ORDER — ONDANSETRON 2 MG/ML
INJECTION INTRAMUSCULAR; INTRAVENOUS AS NEEDED
Status: DISCONTINUED | OUTPATIENT
Start: 2017-08-29 | End: 2017-08-29 | Stop reason: HOSPADM

## 2017-08-29 RX ORDER — DEXAMETHASONE SODIUM PHOSPHATE 4 MG/ML
INJECTION, SOLUTION INTRA-ARTICULAR; INTRALESIONAL; INTRAMUSCULAR; INTRAVENOUS; SOFT TISSUE AS NEEDED
Status: DISCONTINUED | OUTPATIENT
Start: 2017-08-29 | End: 2017-08-29 | Stop reason: HOSPADM

## 2017-08-29 RX ORDER — LIDOCAINE HYDROCHLORIDE 10 MG/ML
0.1 INJECTION, SOLUTION EPIDURAL; INFILTRATION; INTRACAUDAL; PERINEURAL AS NEEDED
Status: CANCELLED | OUTPATIENT
Start: 2017-08-29

## 2017-08-29 RX ORDER — FENTANYL CITRATE 50 UG/ML
25 INJECTION, SOLUTION INTRAMUSCULAR; INTRAVENOUS
Status: COMPLETED | OUTPATIENT
Start: 2017-08-29 | End: 2017-08-29

## 2017-08-29 RX ORDER — DIPHENHYDRAMINE HYDROCHLORIDE 50 MG/ML
12.5 INJECTION, SOLUTION INTRAMUSCULAR; INTRAVENOUS AS NEEDED
Status: DISCONTINUED | OUTPATIENT
Start: 2017-08-29 | End: 2017-08-29 | Stop reason: HOSPADM

## 2017-08-29 RX ORDER — MIDAZOLAM HYDROCHLORIDE 1 MG/ML
INJECTION, SOLUTION INTRAMUSCULAR; INTRAVENOUS AS NEEDED
Status: DISCONTINUED | OUTPATIENT
Start: 2017-08-29 | End: 2017-08-29 | Stop reason: HOSPADM

## 2017-08-29 RX ORDER — HYDROMORPHONE HYDROCHLORIDE 1 MG/ML
0.2 INJECTION, SOLUTION INTRAMUSCULAR; INTRAVENOUS; SUBCUTANEOUS
Status: DISCONTINUED | OUTPATIENT
Start: 2017-08-29 | End: 2017-08-29 | Stop reason: HOSPADM

## 2017-08-29 RX ORDER — GLYCOPYRROLATE 0.2 MG/ML
INJECTION INTRAMUSCULAR; INTRAVENOUS AS NEEDED
Status: DISCONTINUED | OUTPATIENT
Start: 2017-08-29 | End: 2017-08-29 | Stop reason: HOSPADM

## 2017-08-29 RX ORDER — HYDROMORPHONE HYDROCHLORIDE 2 MG/ML
INJECTION, SOLUTION INTRAMUSCULAR; INTRAVENOUS; SUBCUTANEOUS AS NEEDED
Status: DISCONTINUED | OUTPATIENT
Start: 2017-08-29 | End: 2017-08-29 | Stop reason: HOSPADM

## 2017-08-29 RX ORDER — SODIUM CHLORIDE 0.9 % (FLUSH) 0.9 %
5-10 SYRINGE (ML) INJECTION EVERY 8 HOURS
Status: CANCELLED | OUTPATIENT
Start: 2017-08-29

## 2017-08-29 RX ORDER — PROPOFOL 10 MG/ML
INJECTION, EMULSION INTRAVENOUS AS NEEDED
Status: DISCONTINUED | OUTPATIENT
Start: 2017-08-29 | End: 2017-08-29 | Stop reason: HOSPADM

## 2017-08-29 RX ORDER — NEOSTIGMINE METHYLSULFATE 1 MG/ML
INJECTION INTRAVENOUS AS NEEDED
Status: DISCONTINUED | OUTPATIENT
Start: 2017-08-29 | End: 2017-08-29 | Stop reason: HOSPADM

## 2017-08-29 RX ORDER — KETOROLAC TROMETHAMINE 30 MG/ML
INJECTION, SOLUTION INTRAMUSCULAR; INTRAVENOUS AS NEEDED
Status: DISCONTINUED | OUTPATIENT
Start: 2017-08-29 | End: 2017-08-29 | Stop reason: HOSPADM

## 2017-08-29 RX ORDER — CEFAZOLIN SODIUM 1 G/3ML
INJECTION, POWDER, FOR SOLUTION INTRAMUSCULAR; INTRAVENOUS AS NEEDED
Status: DISCONTINUED | OUTPATIENT
Start: 2017-08-29 | End: 2017-08-29 | Stop reason: HOSPADM

## 2017-08-29 RX ORDER — CEFAZOLIN SODIUM IN 0.9 % NACL 2 G/100 ML
2 PLASTIC BAG, INJECTION (ML) INTRAVENOUS ONCE
Status: DISCONTINUED | OUTPATIENT
Start: 2017-08-29 | End: 2017-08-29 | Stop reason: HOSPADM

## 2017-08-29 RX ORDER — ACETAMINOPHEN 10 MG/ML
INJECTION, SOLUTION INTRAVENOUS AS NEEDED
Status: DISCONTINUED | OUTPATIENT
Start: 2017-08-29 | End: 2017-08-29 | Stop reason: HOSPADM

## 2017-08-29 RX ADMIN — PROPOFOL 10 MG: 10 INJECTION, EMULSION INTRAVENOUS at 13:45

## 2017-08-29 RX ADMIN — HYDROMORPHONE HYDROCHLORIDE 0.2 MG: 2 INJECTION, SOLUTION INTRAMUSCULAR; INTRAVENOUS; SUBCUTANEOUS at 14:01

## 2017-08-29 RX ADMIN — NEOSTIGMINE METHYLSULFATE 3 MG: 1 INJECTION INTRAVENOUS at 13:35

## 2017-08-29 RX ADMIN — FENTANYL CITRATE 25 MCG: 50 INJECTION, SOLUTION INTRAMUSCULAR; INTRAVENOUS at 14:40

## 2017-08-29 RX ADMIN — ONDANSETRON 4 MG: 2 INJECTION INTRAMUSCULAR; INTRAVENOUS at 13:35

## 2017-08-29 RX ADMIN — ROCURONIUM BROMIDE 5 MG: 10 INJECTION, SOLUTION INTRAVENOUS at 12:06

## 2017-08-29 RX ADMIN — GLYCOPYRROLATE 0.6 MG: 0.2 INJECTION INTRAMUSCULAR; INTRAVENOUS at 13:35

## 2017-08-29 RX ADMIN — FENTANYL CITRATE 25 MCG: 50 INJECTION, SOLUTION INTRAMUSCULAR; INTRAVENOUS at 14:15

## 2017-08-29 RX ADMIN — KETOROLAC TROMETHAMINE 30 MG: 30 INJECTION, SOLUTION INTRAMUSCULAR; INTRAVENOUS at 13:30

## 2017-08-29 RX ADMIN — HYDROMORPHONE HYDROCHLORIDE 0.2 MG: 2 INJECTION, SOLUTION INTRAMUSCULAR; INTRAVENOUS; SUBCUTANEOUS at 13:13

## 2017-08-29 RX ADMIN — MIDAZOLAM HYDROCHLORIDE 2 MG: 1 INJECTION, SOLUTION INTRAMUSCULAR; INTRAVENOUS at 12:01

## 2017-08-29 RX ADMIN — FENTANYL CITRATE 100 MCG: 50 INJECTION, SOLUTION INTRAMUSCULAR; INTRAVENOUS at 12:39

## 2017-08-29 RX ADMIN — FENTANYL CITRATE 25 MCG: 50 INJECTION, SOLUTION INTRAMUSCULAR; INTRAVENOUS at 14:35

## 2017-08-29 RX ADMIN — FENTANYL CITRATE 25 MCG: 50 INJECTION, SOLUTION INTRAMUSCULAR; INTRAVENOUS at 14:29

## 2017-08-29 RX ADMIN — ROCURONIUM BROMIDE 45 MG: 10 INJECTION, SOLUTION INTRAVENOUS at 12:08

## 2017-08-29 RX ADMIN — Medication 80 MCG: at 12:54

## 2017-08-29 RX ADMIN — ACETAMINOPHEN 1000 MG: 10 INJECTION, SOLUTION INTRAVENOUS at 12:45

## 2017-08-29 RX ADMIN — LIDOCAINE HYDROCHLORIDE 60 MG: 20 INJECTION, SOLUTION EPIDURAL; INFILTRATION; INTRACAUDAL; PERINEURAL at 12:06

## 2017-08-29 RX ADMIN — FENTANYL CITRATE 25 MCG: 50 INJECTION, SOLUTION INTRAMUSCULAR; INTRAVENOUS at 14:19

## 2017-08-29 RX ADMIN — SODIUM CHLORIDE, SODIUM LACTATE, POTASSIUM CHLORIDE, AND CALCIUM CHLORIDE 25 ML/HR: 600; 310; 30; 20 INJECTION, SOLUTION INTRAVENOUS at 10:55

## 2017-08-29 RX ADMIN — DEXAMETHASONE SODIUM PHOSPHATE 10 MG: 4 INJECTION, SOLUTION INTRA-ARTICULAR; INTRALESIONAL; INTRAMUSCULAR; INTRAVENOUS; SOFT TISSUE at 12:21

## 2017-08-29 RX ADMIN — PROPOFOL 20 MG: 10 INJECTION, EMULSION INTRAVENOUS at 13:24

## 2017-08-29 RX ADMIN — FENTANYL CITRATE 100 MCG: 50 INJECTION, SOLUTION INTRAMUSCULAR; INTRAVENOUS at 12:07

## 2017-08-29 RX ADMIN — PROPOFOL 150 MG: 10 INJECTION, EMULSION INTRAVENOUS at 12:07

## 2017-08-29 RX ADMIN — Medication 80 MCG: at 12:53

## 2017-08-29 RX ADMIN — FENTANYL CITRATE 25 MCG: 50 INJECTION, SOLUTION INTRAMUSCULAR; INTRAVENOUS at 14:55

## 2017-08-29 RX ADMIN — HYDROMORPHONE HYDROCHLORIDE 0.4 MG: 2 INJECTION, SOLUTION INTRAMUSCULAR; INTRAVENOUS; SUBCUTANEOUS at 13:10

## 2017-08-29 RX ADMIN — FENTANYL CITRATE 25 MCG: 50 INJECTION, SOLUTION INTRAMUSCULAR; INTRAVENOUS at 14:24

## 2017-08-29 RX ADMIN — ROCURONIUM BROMIDE 10 MG: 10 INJECTION, SOLUTION INTRAVENOUS at 12:48

## 2017-08-29 RX ADMIN — HYDROMORPHONE HYDROCHLORIDE 0.4 MG: 2 INJECTION, SOLUTION INTRAMUSCULAR; INTRAVENOUS; SUBCUTANEOUS at 14:03

## 2017-08-29 RX ADMIN — HYDROMORPHONE HYDROCHLORIDE 0.8 MG: 2 INJECTION, SOLUTION INTRAMUSCULAR; INTRAVENOUS; SUBCUTANEOUS at 14:08

## 2017-08-29 RX ADMIN — Medication 80 MCG: at 13:29

## 2017-08-29 RX ADMIN — CEFAZOLIN SODIUM 2 G: 1 INJECTION, POWDER, FOR SOLUTION INTRAMUSCULAR; INTRAVENOUS at 12:15

## 2017-08-29 RX ADMIN — FENTANYL CITRATE 25 MCG: 50 INJECTION, SOLUTION INTRAMUSCULAR; INTRAVENOUS at 15:00

## 2017-08-29 NOTE — IP AVS SNAPSHOT
[address] [facility]. 
[phone] Patient: Gisel Mauro MRN: [id number] :[id number] You are allergic to the following No active allergies Recent Documentation Height Weight BMI Smoking Status 1.727 m 66.6 kg 22.32 kg/m2 Light Tobacco Smoker Emergency Contacts Name Discharge Info Relation Home Work Mobile Dara Benedict CAREGIVER [3] Friend [5] [phone] About your hospitalization You were admitted on:  2017 You last received care in the:  [facility] PACU You were discharged on:  2017 Unit phone number:  [phone] Why you were hospitalized Your primary diagnosis was:  Not on File Providers Seen During Your Hospitalizations Provider Role Specialty Primary office phone Leni Senior MD Attending Provider General Surgery [phone] Your Primary Care Physician (PCP) Primary Care Physician Office Phone Office Fax NONE ** None ** ** None ** Follow-up Information Follow up With Details Comments Contact Info None   None ([phone]) Patient stated that they have no PCP Your Appointments 2017  1:00 PM EDT  
POST OP with Leni Senior MD  
[facility] Dr. Byron Guajardo [facility] ([facility]) [address] [place] [address], [address] [place] [address]  
[phone] Current Discharge Medication List  
  
START taking these medications Dose & Instructions Dispensing Information Comments Morning Noon Evening Bedtime  
 oxyCODONE-acetaminophen 5-325 mg per tablet Commonly known as:  PERCOCET Your last dose was: Your next dose is:    
   
   
 Dose:  1-2 Tab Take 1-2 Tabs by mouth every four (4) hours as needed for Pain. Max Daily Amount: 12 Tabs. Quantity:  30 Tab Refills:  0 CONTINUE these medications which have NOT CHANGED Dose & Instructions Dispensing Information Comments Morning Noon Evening Bedtime HYDROcodone-acetaminophen 5-325 mg per tablet Commonly known as:  Belia Ramos Your last dose was: Your next dose is:    
   
   
 Dose:  1-2 Tab Take 1-2 Tabs by mouth every four (4) hours as needed. Max Daily Amount: 12 Tabs. Indications: Pain Quantity:  20 Tab Refills:  0  
     
   
   
   
  
 ondansetron 4 mg disintegrating tablet Commonly known as:  ZOFRAN ODT Your last dose was: Your next dose is:    
   
   
 Dose:  4 mg Take 1 Tab by mouth every eight (8) hours as needed. Quantity:  10 Tab Refills:  0 Where to Get Your Medications Information on where to get these meds will be given to you by the nurse or doctor. ! Ask your nurse or doctor about these medications  
  oxyCODONE-acetaminophen 5-325 mg per tablet Discharge Instructions Discharge Instructions After Removal of Gallbladder It is normal to tire easily for a while. No lifting over 15 pounds for one month. It is OK to walk up stairs or bend over as needed. Do not drive for 3 days and do not drive while taking narcotic pain medication. Remove any gauze dressings tomorrow. Let Dermabond (plastic coating on incisions) wear off on its own. It is OK to shower. Eat lightly today, then follow your normal diet. You may use stool softeners such as Colace or a laxative such as Miralax as needed for constipation. Take pain medicines as prescribed as needed, or use you existing hydrocodone or Advil or Tylenol. Ibuprofen (Advil) up to 800 mg by mouth every 6 hours as needed for pain may be helpful for pain control and can be taken together with, or in place of, narcotic pain medication. Reduce dose to 200 mg by mouth every 6 hours as needed for pain after 2 days. Call for follow up appointment in 2 weeks  - [phone] If you have any problems, call Dr Erinn Tee at [phone] or [phone] (after hours) SREE Guerra MD 
 
 
 
DISCHARGE SUMMARY from Nurse The following personal items are in your possession at time of discharge: 
 
Dental Appliances: None Visual Aid: None Home Medications: None Jewelry: None Clothing: Other (comment) (street clothes) Other Valuables: None Personal Items Sent to Safe: declined PATIENT INSTRUCTIONS: 
 
After general anesthesia or intravenous sedation, for 24 hours or while taking prescription Narcotics: · Limit your activities · Do not drive and operate hazardous machinery · Do not make important personal or business decisions · Do  not drink alcoholic beverages · If you have not urinated within 8 hours after discharge, please contact your surgeon on call. Report the following to your surgeon: 
· Excessive pain, swelling, redness or odor of or around the surgical area · Temperature over 100.5 · Nausea and vomiting lasting longer than 4 hours or if unable to take medications · Any signs of decreased circulation or nerve impairment to extremity: change in color, persistent  numbness, tingling, coldness or increase pain · Any questions What to do at Home: *  Please give a list of your current medications to your Primary Care Provider. *  Please update this list whenever your medications are discontinued, doses are 
    changed, or new medications (including over-the-counter products) are added. *  Please carry medication information at all times in case of emergency situations. These are general instructions for a healthy lifestyle: No smoking/ No tobacco products/ Avoid exposure to second hand smoke Surgeon General's Warning:  Quitting smoking now greatly reduces serious risk to your health. Obesity, smoking, and sedentary lifestyle greatly increases your risk for illness A healthy diet, regular physical exercise & weight monitoring are important for maintaining a healthy lifestyle You may be retaining fluid if you have a history of heart failure or if you experience any of the following symptoms:  Weight gain of 3 pounds or more overnight or 5 pounds in a week, increased swelling in our hands or feet or shortness of breath while lying flat in bed. Please call your doctor as soon as you notice any of these symptoms; do not wait until your next office visit. Recognize signs and symptoms of STROKE: 
 
F-face looks uneven A-arms unable to move or move unevenly S-speech slurred or non-existent T-time-call 911 as soon as signs and symptoms begin-DO NOT go Back to bed or wait to see if you get better-TIME IS BRAIN. Warning Signs of HEART ATTACK Call 911 if you have these symptoms: 
? Chest discomfort. Most heart attacks involve discomfort in the center of the chest that lasts more than a few minutes, or that goes away and comes back. It can feel like uncomfortable pressure, squeezing, fullness, or pain. ? Discomfort in other areas of the upper body. Symptoms can include pain or discomfort in one or both arms, the back, neck, jaw, or stomach. ? Shortness of breath with or without chest discomfort. ? Other signs may include breaking out in a cold sweat, nausea, or lightheadedness. Don't wait more than five minutes to call [address]! Fast action can save your life. Calling 911 is almost always the fastest way to get lifesaving treatment. Emergency Medical Services staff can begin treatment when they arrive  up to an hour sooner than if someone gets to the hospital by car. The discharge information has been reviewed with the patient and caregiver. The patient and caregiver verbalized understanding. Discharge medications reviewed with the patient and caregiver and appropriate educational materials and side effects teaching were provided. A common side effect of anesthesia following surgery is nausea and/or vomiting. In order to decrease symptoms, it is wise to avoid foods that are high in fat, greasy foods, milk products, and spicy foods for the first 24 hours. Acceptable foods for the first 24 hours following surgery include but are not limited to: 
 
? soup 
? broth 
?  toast  
? crackers ? applesauce 
? bananas  
? mashed potatoes, 
? soft or scrambled eggs 
? oatmeal 
?  jello It is important to eat when taking your pain medication. This will help to prevent nausea. If possible, please try to time your meals with your medications. It is very important to stay hydrated following surgery. Sip fluids frequently while awake. Avoid acidic drinks such as citrus juices and soda for 24 hours. Carbonated beverages may cause bloating and gas. Acceptable fluids include: 
 
? water (flavor packets may add variety) ? coffee or tea (in moderation) ? Gatorade ? Ayo Blower ? apple juice 
? cranberry juice You are encouraged to cough and deep breathe every hour when awake. This will help to prevent respiratory complications following anesthesia. You may want to hug a pillow when coughing and sneezing to add additional support to the surgical area and to decrease discomfort if you had abdominal or chest surgery. If you are discharged home with support stockings, you may remove them after 24 hours. Support stockings are used to help prevent blood clots in the legs following surgery. Please take time to review all of your Home Care Instructions and Medication Information sheets provided in your discharge packet. If you have any questions, please contact your surgeons office. Thank you. How to Care for Your Wound After Its Treated With DERMABOND* Topical Skin Adhesive DERMABOND* Topical Skin Adhesive (2-octyl cyanoacrylate) is a sterile, liquid skin adhesive that holds wound edges together. The film will usually remain in place for 5 to 10 days, then 
naturally fall off your skin. The following will answer some of your questions and provide instructions for proper care for your 
wound while it is healing: CHECK WOUND APPEARANCE 
 Some swelling, redness, and pain are common with all wounds and normally will go away as the 
wound heals. If swelling, redness, or pain increases or if the wound feels warm to the touch, 
contact a doctor. Also contact a doctor if the wound edges reopen or separate. REPLACE BANDAGES 
 If your wound is bandaged, keep the bandage dry.  Replace the dressing daily until the adhesive film has fallen off or if the 
bandage should become wet, unless otherwise instructed by your 
physician.  When changing the dressing, do not place tape directly over the DERMABOND adhesive film, because removing the tape later may also 
remove the film. AVOID TOPICAL MEDICATIONS  Do not apply liquid or ointment medications or any other product to your wound while the DERMABOND adhesive film is in place. These may loosen the film before your wound is healed. KEEP WOUND DRY AND PROTECTED  You may occasionally and briefly wet your wound in the shower or bath. Do not soak or scrub 
your wound, do not swim, and avoid periods of heavy perspiration until the DERMABOND 
adhesive has naturally fallen off. After showering or bathing, gently blot your wound dry with a 
soft towel. If a protective dressing is being used, apply a fresh, dry bandage, being sure to keep 
the tape off the DERMABOND adhesive film.  Apply a clean, dry bandage over the wound if necessary to protect it.  Protect your wound from injury until the skin has had sufficient time to heal. 
 Do not scratch, rub, or pick at the DERMABOND adhesive film. This may loosen the film before 
your wound is healed.  
 Protect the wound from prolonged exposure to sunlight or tanning lamps while the film is in 
place. If you have any questions or concerns about this product, please consult your doctor. *Trademark ©[employer], inc[employer] 2002 Narcotic-Analgesic/Acetaminophen (By mouth) Relieves pain. Brand Name(s): Bj BridgeXs w/Codeine, Jasper, Echt, [place], Lorcet HD, Lorcet Plus, Lortab 10/325, Lortab 5/325, Lortab 7.5/325, [employer], Raymond Bugler, Burgin, Jin, [place], [employer] There may be other brand names for this medicine. When This Medicine Should Not Be Used: You should not use this medicine if you have had an allergic reaction to acetaminophen, codeine, hydrocodone, propoxyphene, or sulfites. You should not use this medicine if you have had an allergic reaction to any other opioid pain medicine. How to Use This Medicine:  
Liquid, Tablet, Capsule · Your doctor will tell you how much medicine to use. Do not use more than directed. · This medicine contains acetaminophen. Read the labels of all other medicines you are using to see if they also contain acetaminophen, or ask your doctor or pharmacist. Christian Aileen not use more than 4 grams (4,000 milligrams) total of acetaminophen in one day. · Drink plenty of liquids to help avoid constipation. If a dose is missed: · Some of these medicines need to be used on a fixed schedule. If you miss a dose or forget to use your medicine, call your doctor pharmacist for instructions. Do not use extra medicine to make up for a missed dose. How to Store and Dispose of This Medicine: · Store the medicine in a closed container at room temperature, away from heat, moisture, and direct light. Do not refrigerate or freeze the medicine. · Ask your pharmacist, doctor, or health caregiver about the best way to dispose of any outdated medicine or medicine no longer needed. · Keep all medicine out of the reach of children. Never share your medicine with anyone. Drugs and Foods to Avoid: Ask your doctor or pharmacist before using any other medicine, including over-the-counter medicines, vitamins, and herbal products. · Make sure your doctor knows if you are using a monoamine oxidase inhibitor (MAOI) medicine, such as Eldepryl®, Marplan®, [place], or Parnate®. Make sure your doctor knows if you are also using a medicine to treat depression, such as amitriptyline, doxepin, nortriptyline, Elavil®, Pamelor®, or Sinequan®. Make sure your doctor knows if you are taking an anticholinergic medicine, such as atropine, methscopolamine, or scopolamine. · Tell your doctor if you use anything else that makes you sleepy. Some examples are allergy medicine, narcotic pain medicine, and alcohol. · Do not drink alcohol while you are using this medicine. Warnings While Using This Medicine: · Make sure your doctor knows if you are pregnant or breast feeding, or if you have a head injury, or other conditions that may cause an increase in intercranial pressure (pressure inside your head). Make sure your doctor knows if you have severe kidney problems or severe liver problems, or if you have hypothyroidism (lack of thyroid function). Make sure your doctor knows if you have [place]'s disease (adrenal gland disease), or if you have enlarged prostate or urethral stricture. Make sure your doctor knows if you have any abdominal problems, or if you have lung disease or asthma. · This medicine may make you dizzy or drowsy. Avoid driving, using machines, or anything else that could be dangerous if you are not alert. · This medicine can be habit-forming. Do not use more than your prescribed dose. Call your doctor if you think your medicine is not working. · Tell any doctor or dentist who treats you that you are using this medicine. This medicine may affect certain medical test results. · This medicine may cause constipation, especially with long-term use. Ask your doctor if you should use a laxative to prevent and treat constipation. · When a mother is breastfeeding and takes codeine, there is a very small chance that this medicine could cause serious side effects in the baby. This is because codeine works differently in a few women, so their breast milk contains too much medicine. If you take codeine, be alert for these signs of overdose in your nursing baby: sleeping more than usual, trouble breastfeeding, trouble breathing, or being limp and weak. Call the baby's doctor right away if you think there is a problem. If you cannot talk to the doctor, take the baby to the emergency room or call 911. Possible Side Effects While Using This Medicine:  
Call your doctor right away if you notice any of these side effects: · Allergic reaction: Itching or hives, swelling in your face or hands, swelling or tingling in your mouth or throat, chest tightness, trouble breathing · Dizziness. · Seeing or hearing things that are not there. · Very slow heartbeat. If you notice these less serious side effects, talk with your doctor: · Change in how much or how often you urinate. · Cold, clammy skin. · Feeling unusually anxious, excited, fearful, or tired. · Nausea, vomiting, constipation, stomach pain or upset, or heartburn. · Skin rash. · Vision changes. If you notice other side effects that you think are caused by this medicine, tell your doctor. Call your doctor for medical advice about side effects. You may report side effects to FDA at 0-516-FDA-9393 © 2017 [address] Information is for End User's use only and may not be sold, redistributed or otherwise used for commercial purposes. The above information is an  only. It is not intended as medical advice for individual conditions or treatments. Talk to your doctor, nurse or pharmacist before following any medical regimen to see if it is safe and effective for you. Discharge Orders None Introducing [facility]! Patti Prescott introduces [employer] patient portal. Now you can access parts of your medical record, email your doctor's office, and request medication refills online. 1. In your internet browser, go to [email or web address] 2. Click on the First Time User? Click Here link in the Sign In box. You will see the New Member Sign Up page. 3. Enter your [employer] Access Code exactly as it appears below. You will not need to use this code after youve completed the sign-up process. If you do not sign up before the expiration date, you must request a new code. · [employer] Access Code: [email or web address] Expires: 9/20/2017 12:32 PM 
 
4. Enter the last four digits of your Social Security Number (xxxx) and Date of Birth (mm/dd/yyyy) as indicated and click Submit. You will be taken to the next sign-up page. 5. Create a [employer] ID. This will be your [employer] login ID and cannot be changed, so think of one that is secure and easy to remember. 6. Create a [employer] password. You can change your password at any time. 7. Enter your Password Reset Question and Answer. This can be used at a later time if you forget your password. 8. Enter your e-mail address. You will receive e-mail notification when new information is available in [address] 9. Click Sign Up. You can now view and download portions of your medical record. 10. Click the Download Summary menu link to download a portable copy of your medical information. If you have questions, please visit the Frequently Asked Questions section of the [employer] website. Remember, [employer] is NOT to be used for urgent needs. For medical emergencies, dial 911. Now available from your iPhone and Android! General Information Please provide this summary of care documentation to your next provider. Patient Signature:  ____________________________________________________________ Date:  ____________________________________________________________  
  
Kala Piety Provider Signature:  ____________________________________________________________ Date:  ____________________________________________________________

## 2017-08-29 NOTE — OP NOTES
Operative Report        Laparoscopic Cholecystectomy With Cholangiogram        Patient:  Dago Mueller    Date of Surgery:  8/29/2017    Preoperative diagnosis: gallbladder sludge, biliary dyskinesia    Postoperative Diagnosis: Same    Anesthesia:  General    Surgeon:  Philomena Rosenthal     Procedure :  Laparoscopic Cholecystectomy with Cholangiogram     Operative Summary:  The patient was taken to the operating room and placed on the operating table in the supine position. General anesthesia was induced. The patient's abdomen was prepared and draped. The patient had midline scar from prior surgery. An incision was made in the skin just below the xyphoid and carried down to the fascias. The fascia was opened and the rectus muscle retracted laterally to the right. The posterior sheath was opened and the peritoneum was opened. . The peritoneal cavity was entered. A blunt tipped 12 mm trochar and sheath were introduced and the abdomen was inflated with CO2. The laparoscope was introduced. There were adhesion in the mid abdomen but the upper abdomen was clear. The liver appeared normal.  The gallbladder was not acutely inflamed. Under visualization,  two 5mm ports were placed in the right upper quadrant. Under visualization, a 5 mm port was place just to the right of the umbilicus. The fundus of the gallbladder was grasped and lifted upward. Adhesions to the gallbladder were moderate and were stripped away. The infundibulum of the gallbladder was grasped and lifted upward and to the right. The peritoneum at the lower end of the gallbladder was stripped away and the cystic duct was exposed for about 2.5 centimeters. The cystic duct was observed without tension to avoid tenting of the common bile duct. A single clip was placed on the cystic duct next to the gallbladder, then an opening was made in the cystic duct next to the clip.   A balloon tipped cholangiogram catheter was inserted into the cystic duct and inflated. Using fluoroscopy and still images, with supervision of radiologic personel and interpretation of images, contrast was injected for an operative cholangiogram. This showed normal appearance. The catheter was then removed. The cystic duct was clipped with two clips on the patient's side and divided adjacent to the gallbladder clip. The cystic artery was dissected out  for about 2.5 centimeters and was seen going up onto the wall of the gallbladder. The cystic artery was doubly clipped on the patient's side and singly clipped on the gallbladder side and divided. Attachments of the infundibulum to the liver were stripped away. Small vessels were clipped and divided. The body of the gallbladder was then  from its bed on the liver with electrocautery. Once the gallbladder was freed, it was placed in a polyurethane sack and brought out through the subxyphoid port site. The  port was then replaced and the abdomen reinflated. The operative site was inspected and there was good hemostasis. There was no evidence of bile leak. Under visualization a 7 mm Hemal-Feldman drain was placed in the bed of the mobilized gallbladder and brought out through one of the right upper quadrant port sites. The upper ports were removed under laparoscopic visualization and the abdomen deflated. The umbilical port was removed. The fascia at the subxyphoid site was closed with 0 Vicryl. The skin at all the sites was closed with interuppted inverted intracuticular 4-0 Vicryl. The drain was sewn in place with 3-0 nylon. Dermabond was then applied to the skin incisions. The patient tolerated the surgery well and was taken to the PACU in stable condition.     Specimen - Gallbladder     Drain - Hemal-Feldman    Estimate Blood Loss - 30 ml     Complications - none      MONI Del Valle MD

## 2017-08-29 NOTE — PERIOP NOTES
1405-Handoff Report from Operating Room to PACU    Report received from Bhanu Gonsales and CANDIS Ruggiero CRNA regarding Zohreh Dunham. Surgeon(s):  Alejandra Welch MD  And Procedure(s) (LRB):  LAPAROSCOPIC CHOLECYSTECTOMY WITH GRAMS (N/A)  confirmed   with allergies, drains and dressings discussed. Anesthesia type, drugs, patient history, complications, estimated blood loss, vital signs, intake and output, and last pain medication and lines were reviewed. 1430- Family updated. 1455- NAYAN drain removed without difficulty. Dressing applied.

## 2017-08-29 NOTE — IP AVS SNAPSHOT
Summary of Care Report The Summary of Care report has been created to help improve care coordination. Users with access to [employer] or [facility] (Web-based application) may access additional patient information including the Discharge Summary. If you are not currently a [address] user and need more information, please call the number listed below in the [place] [address] section and ask to be connected with Medical Records. Facility Information Name Address Phone [address] [address] [phone] Patient Information Patient Name Sex  Arti  ([id number]) Male 1965 Discharge Information Admitting Provider Service Area Unit Mazin Das MD / [phone] [address] / [phone] Discharge Provider Discharge Date/Time Discharge Disposition Destination (none) 2017 (Pending) AHR (none) Patient Language Language ENGLISH [13] Hospital Problems as of 2017  Reviewed: 2017 10:11 AM by Elenita Ho MD  
 None Non-Hospital Problems as of 2017  Reviewed: 2017 10:11 AM by Elenita Ho MD  
  
  
  
 Class Noted - Resolved Last Modified Active Problems   D-dimer, elevated  6/15/2017 - Present 6/15/2017 by Abdi Higginbotham MD  
  Entered by Keyla Torres MD  
  Pleural effusion, left  2017 - Present 2017 by Abdi Higginbotham MD  
  Entered by Abdi Higginbotham MD  
  RUQ pain  2017 - Present 2017 by Shruthi Calderon MD  
  Entered by Shruthi Calderon MD  
  Gallbladder sludge  2017 - Present 2017 by Mazin Das MD  
  Entered by Mazin Das MD  
  Biliary dyskinesia  2017 - Present 2017 by Mazin Das MD  
  Entered by Mazin Das MD  
  
 You are allergic to the following No active allergies Current Discharge Medication List  
  
START taking these medications Dose & Instructions Dispensing Information Comments  
 oxyCODONE-acetaminophen 5-325 mg per tablet Commonly known as:  PERCOCET Dose:  1-2 Tab Take 1-2 Tabs by mouth every four (4) hours as needed for Pain. Max Daily Amount: 12 Tabs. Quantity:  30 Tab Refills:  0 CONTINUE these medications which have NOT CHANGED Dose & Instructions Dispensing Information Comments HYDROcodone-acetaminophen 5-325 mg per tablet Commonly known as:  Jenifer Dimes Dose:  1-2 Tab Take 1-2 Tabs by mouth every four (4) hours as needed. Max Daily Amount: 12 Tabs. Indications: Pain Quantity:  20 Tab Refills:  0  
   
 ondansetron 4 mg disintegrating tablet Commonly known as:  ZOFRAN ODT Dose:  4 mg Take 1 Tab by mouth every eight (8) hours as needed. Quantity:  10 Tab Refills:  0 Surgery Information ID Date/Time Status Primary Surgeon All Procedures Location [id number] 8/29/2017 1200 Unposted Sotero Varner MD LAPAROSCOPIC CHOLECYSTECTOMY WITH GRAMS [facility] Follow-up Information Follow up With Details Comments Contact Info None   None ([phone]) Patient stated that they have no PCP Discharge Instructions Discharge Instructions After Removal of Gallbladder It is normal to tire easily for a while. No lifting over 15 pounds for one month. It is OK to walk up stairs or bend over as needed. Do not drive for 3 days and do not drive while taking narcotic pain medication. Remove any gauze dressings tomorrow. Let Dermabond (plastic coating on incisions) wear off on its own. It is OK to shower. Eat lightly today, then follow your normal diet. You may use stool softeners such as Colace or a laxative such as Miralax as needed for constipation. Take pain medicines as prescribed as needed, or use you existing hydrocodone or Advil or Tylenol. Ibuprofen (Advil) up to 800 mg by mouth every 6 hours as needed for pain may be helpful for pain control and can be taken together with, or in place of, narcotic pain medication. Reduce dose to 200 mg by mouth every 6 hours as needed for pain after 2 days. Call for follow up appointment in 2 weeks  - [phone] If you have any problems, call Dr Erinn Tee at [phone] or [phone] (after hours) SREE Guerra MD 
 
 
 
DISCHARGE SUMMARY from Nurse The following personal items are in your possession at time of discharge: 
 
Dental Appliances: None Visual Aid: None Home Medications: None Jewelry: None Clothing: Other (comment) (street clothes) Other Valuables: None Personal Items Sent to Safe: declined PATIENT INSTRUCTIONS: 
 
After general anesthesia or intravenous sedation, for 24 hours or while taking prescription Narcotics: · Limit your activities · Do not drive and operate hazardous machinery · Do not make important personal or business decisions · Do  not drink alcoholic beverages · If you have not urinated within 8 hours after discharge, please contact your surgeon on call. Report the following to your surgeon: 
· Excessive pain, swelling, redness or odor of or around the surgical area · Temperature over 100.5 · Nausea and vomiting lasting longer than 4 hours or if unable to take medications · Any signs of decreased circulation or nerve impairment to extremity: change in color, persistent  numbness, tingling, coldness or increase pain · Any questions What to do at Home: *  Please give a list of your current medications to your Primary Care Provider. *  Please update this list whenever your medications are discontinued, doses are 
    changed, or new medications (including over-the-counter products) are added. *  Please carry medication information at all times in case of emergency situations. These are general instructions for a healthy lifestyle: No smoking/ No tobacco products/ Avoid exposure to second hand smoke Surgeon General's Warning:  Quitting smoking now greatly reduces serious risk to your health. Obesity, smoking, and sedentary lifestyle greatly increases your risk for illness A healthy diet, regular physical exercise & weight monitoring are important for maintaining a healthy lifestyle You may be retaining fluid if you have a history of heart failure or if you experience any of the following symptoms:  Weight gain of 3 pounds or more overnight or 5 pounds in a week, increased swelling in our hands or feet or shortness of breath while lying flat in bed. Please call your doctor as soon as you notice any of these symptoms; do not wait until your next office visit. Recognize signs and symptoms of STROKE: 
 
 
? soup 
? broth 
?  toast  
? crackers ? applesauce 
? bananas  
? mashed potatoes, 
? soft or scrambled eggs 
? oatmeal 
?  jello It is important to eat when taking your pain medication. This will help to prevent nausea. If possible, please try to time your meals with your medications. It is very important to stay hydrated following surgery. Sip fluids frequently while awake. Avoid acidic drinks such as citrus juices and soda for 24 hours. Carbonated beverages may cause bloating and gas. Acceptable fluids include: 
 
? water (flavor packets may add variety) ? coffee or tea (in moderation) ? Gatorade ? [place] ? apple juice 
? cranberry juice You are encouraged to cough and deep breathe every hour when awake. This will help to prevent respiratory complications following anesthesia. You may want to hug a pillow when coughing and sneezing to add additional support to the surgical area and to decrease discomfort if you had abdominal or chest surgery. If you are discharged home with support stockings, you may remove them after 24 hours. Support stockings are used to help prevent blood clots in the legs following surgery. Please take time to review all of your Home Care Instructions and Medication Information sheets provided in your discharge packet. If you have any questions, please contact your surgeons office. Thank you. How to Care for Your Wound After Its Treated With DERMABOND* Topical Skin Adhesive DERMABOND* Topical Skin Adhesive (2-octyl cyanoacrylate) is a sterile, liquid skin adhesive 
that holds wound edges together. The film will usually remain in place for 5 to 10 days, then 
naturally fall off your skin. The following will answer some of your questions and provide instructions for proper care for your 
wound while it is healing: CHECK WOUND APPEARANCE 
 Some swelling, redness, and pain are common with all wounds and normally will go away as the 
wound heals. If swelling, redness, or pain increases or if the wound feels warm to the touch, 
contact a doctor. Also contact a doctor if the wound edges reopen or separate. REPLACE BANDAGES 
 If your wound is bandaged, keep the bandage dry.  Replace the dressing daily until the adhesive film has fallen off or if the 
bandage should become wet, unless otherwise instructed by your 
physician.  When changing the dressing, do not place tape directly over the DERMABOND adhesive film, because removing the tape later may also 
remove the film. AVOID TOPICAL MEDICATIONS  Do not apply liquid or ointment medications or any other product to your wound while the DERMABOND adhesive film is in place. These may loosen the film before your wound is healed. KEEP WOUND DRY AND PROTECTED  You may occasionally and briefly wet your wound in the shower or bath. Do not soak or scrub 
your wound, do not swim, and avoid periods of heavy perspiration until the DERMABOND 
adhesive has naturally fallen off. After showering or bathing, gently blot your wound dry with a 
soft towel. If a protective dressing is being used, apply a fresh, dry bandage, being sure to keep the tape off the DERMABOND adhesive film.  Apply a clean, dry bandage over the wound if necessary to protect it.  Protect your wound from injury until the skin has had sufficient time to heal. 
 Do not scratch, rub, or pick at the DERMABOND adhesive film. This may loosen the film before 
your wound is healed.  Protect the wound from prolonged exposure to sunlight or tanning lamps while the film is in 
place. If you have any questions or concerns about this product, please consult your doctor. *Trademark ©[employer], inc. 2002 Narcotic-Analgesic/Acetaminophen (By mouth) Relieves pain. Brand Name(s): [employer] BITEX w/Codeine, [place], [place], [place], Lorcet HD, Lorcet Plus, Lortab 10/325, Lortab 5/325, Lortab 7.5/325, [employer], Tiarno Di Sopra, [place], Jin, [place], [employer] There may be other brand names for this medicine. When This Medicine Should Not Be Used: You should not use this medicine if you have had an allergic reaction to acetaminophen, codeine, hydrocodone, propoxyphene, or sulfites. You should not use this medicine if you have had an allergic reaction to any other opioid pain medicine. How to Use This Medicine:  
Liquid, Tablet, Capsule · Your doctor will tell you how much medicine to use. Do not use more than directed. · This medicine contains acetaminophen. Read the labels of all other medicines you are using to see if they also contain acetaminophen, or ask your doctor or pharmacist. Juan Álvarez not use more than 4 grams (4,000 milligrams) total of acetaminophen in one day. · Drink plenty of liquids to help avoid constipation. If a dose is missed: · Some of these medicines need to be used on a fixed schedule. If you miss a dose or forget to use your medicine, call your doctor pharmacist for instructions. Do not use extra medicine to make up for a missed dose. How to Store and Dispose of This Medicine: · Store the medicine in a closed container at room temperature, away from heat, moisture, and direct light. Do not refrigerate or freeze the medicine. · Ask your pharmacist, doctor, or health caregiver about the best way to dispose of any outdated medicine or medicine no longer needed. · Keep all medicine out of the reach of children. Never share your medicine with anyone. Drugs and Foods to Avoid: Ask your doctor or pharmacist before using any other medicine, including over-the-counter medicines, vitamins, and herbal products. · Make sure your doctor knows if you are using a monoamine oxidase inhibitor (MAOI) medicine, such as Eldepryl®, Marplan®, [place], or Parnate®. Make sure your doctor knows if you are also using a medicine to treat depression, such as amitriptyline, doxepin, nortriptyline, Elavil®, Pamelor®, or Sinequan®. Make sure your doctor knows if you are taking an anticholinergic medicine, such as atropine, methscopolamine, or scopolamine. · Tell your doctor if you use anything else that makes you sleepy. Some examples are allergy medicine, narcotic pain medicine, and alcohol. · Do not drink alcohol while you are using this medicine. Warnings While Using This Medicine: · Make sure your doctor knows if you are pregnant or breast feeding, or if you have a head injury, or other conditions that may cause an increase in intercranial pressure (pressure inside your head). Make sure your doctor knows if you have severe kidney problems or severe liver problems, or if you have hypothyroidism (lack of thyroid function). Make sure your doctor knows if you have Chandra's disease (adrenal gland disease), or if you have enlarged prostate or urethral stricture. Make sure your doctor knows if you have any abdominal problems, or if you have lung disease or asthma. · This medicine may make you dizzy or drowsy. Avoid driving, using machines, or anything else that could be dangerous if you are not alert. · This medicine can be habit-forming.  Do not use more than your prescribed dose. Call your doctor if you think your medicine is not working. · Tell any doctor or dentist who treats you that you are using this medicine. This medicine may affect certain medical test results. · This medicine may cause constipation, especially with long-term use. Ask your doctor if you should use a laxative to prevent and treat constipation. · When a mother is breastfeeding and takes codeine, there is a very small chance that this medicine could cause serious side effects in the baby. This is because codeine works differently in a few women, so their breast milk contains too much medicine. If you take codeine, be alert for these signs of overdose in your nursing baby: sleeping more than usual, trouble breastfeeding, trouble breathing, or being limp and weak. Call the baby's doctor right away if you think there is a problem. If you cannot talk to the doctor, take the baby to the emergency room or call 911. Possible Side Effects While Using This Medicine:  
Call your doctor right away if you notice any of these side effects: · Allergic reaction: Itching or hives, swelling in your face or hands, swelling or tingling in your mouth or throat, chest tightness, trouble breathing · Dizziness. · Seeing or hearing things that are not there. · Very slow heartbeat. If you notice these less serious side effects, talk with your doctor: · Change in how much or how often you urinate. · Cold, clammy skin. · Feeling unusually anxious, excited, fearful, or tired. · Nausea, vomiting, constipation, stomach pain or upset, or heartburn. · Skin rash. · Vision changes. If you notice other side effects that you think are caused by this medicine, tell your doctor. Call your doctor for medical advice about side effects. You may report side effects to FDA at 4-822-FDA-0117 © 2017 [address] Information is for End User's use only and may not be sold, redistributed or otherwise used for commercial purposes. The above information is an  only. It is not intended as medical advice for individual conditions or treatments. Talk to your doctor, nurse or pharmacist before following any medical regimen to see if it is safe and effective for you. Chart Review Routing History No Routing History on File

## 2017-08-29 NOTE — ANESTHESIA POSTPROCEDURE EVALUATION
Post-Anesthesia Evaluation and Assessment    Patient: Emil Rodriguez MRN: [id number]  SSN: [id number]    YOB: 1965  Age: 47 y.o. Sex: male       Cardiovascular Function/Vital Signs  Visit Vitals    /81    Pulse 72    Temp 36.6 °C (97.9 °F)    Resp 11    Ht 5' 8\" (1.727 m)    Wt 66.6 kg (146 lb 13.2 oz)    SpO2 95%    BMI 22.32 kg/m2       Patient is status post general anesthesia for Procedure(s):  LAPAROSCOPIC CHOLECYSTECTOMY WITH GRAMS. Nausea/Vomiting: None    Postoperative hydration reviewed and adequate. Pain:  Pain Scale 1: Numeric (0 - 10) (08/29/17 1500)  Pain Intensity 1: 5 (08/29/17 1500)   Managed    Neurological Status:   Neuro (WDL): Exceptions to WDL (08/29/17 1405)  Neuro  Neurologic State: Drowsy (08/29/17 1405)  Orientation Level: Oriented X4 (08/29/17 1405)  Cognition: Follows commands (08/29/17 1405)  Speech: Clear (08/29/17 1405)  LUE Motor Response: Purposeful (08/29/17 1405)  LLE Motor Response: Purposeful (08/29/17 1405)  RUE Motor Response: Purposeful (08/29/17 1405)  RLE Motor Response: Purposeful (08/29/17 1405)   At baseline    Mental Status and Level of Consciousness: Arousable    Pulmonary Status:   O2 Device: Room air (08/29/17 1500)   Adequate oxygenation and airway patent    Complications related to anesthesia: None    Post-anesthesia assessment completed.  No concerns    Signed By: Candace Sanders MD     August 29, 2017

## 2017-08-29 NOTE — PERIOP NOTES
For dc home. Vswnl. Denies pain, nausea. dsgs to abd d&i. Went over [employer] instructions w/pt and wife including Rx and f/up. Verbalized understanding.

## 2017-08-29 NOTE — ANESTHESIA PREPROCEDURE EVALUATION
Anesthetic History   No history of anesthetic complications            Review of Systems / Medical History  Patient summary reviewed, nursing notes reviewed and pertinent labs reviewed    Pulmonary                Comments: Former smoker  Recent pneumonia (6 weeks ago)  H/O left pleural effusion   Neuro/Psych   Within defined limits           Cardiovascular  Within defined limits                Exercise tolerance: >4 METS     GI/Hepatic/Renal               Comments: Biliary Dyskinesia  Gallbladder Sludge Endo/Other  Within defined limits           Other Findings   Comments: Marijuana use         Physical Exam    Airway  Mallampati: II    Neck ROM: normal range of motion   Mouth opening: Normal     Cardiovascular  Regular rate and rhythm,  S1 and S2 normal,  no murmur, click, rub, or gallop             Dental  No notable dental hx       Pulmonary  Breath sounds clear to auscultation               Abdominal  GI exam deferred       Other Findings            Anesthetic Plan    ASA: 2  Anesthesia type: general          Induction: Intravenous  Anesthetic plan and risks discussed with: Patient

## 2017-08-29 NOTE — IP AVS SNAPSHOT
[address] [facility] 
[phone] Patient: Annette Chang MRN: [id number] :1965 Current Discharge Medication List  
  
START taking these medications Dose & Instructions Dispensing Information Comments Morning Noon Evening Bedtime  
 oxyCODONE-acetaminophen 5-325 mg per tablet Commonly known as:  PERCOCET Your last dose was: Your next dose is:    
   
   
 Dose:  1-2 Tab Take 1-2 Tabs by mouth every four (4) hours as needed for Pain. Max Daily Amount: 12 Tabs. Quantity:  30 Tab Refills:  0 CONTINUE these medications which have NOT CHANGED Dose & Instructions Dispensing Information Comments Morning Noon Evening Bedtime HYDROcodone-acetaminophen 5-325 mg per tablet Commonly known as:  [place] No Your last dose was: Your next dose is:    
   
   
 Dose:  1-2 Tab Take 1-2 Tabs by mouth every four (4) hours as needed. Max Daily Amount: 12 Tabs. Indications: Pain Quantity:  20 Tab Refills:  0  
     
   
   
   
  
 ondansetron 4 mg disintegrating tablet Commonly known as:  ZOFRAN ODT Your last dose was: Your next dose is:    
   
   
 Dose:  4 mg Take 1 Tab by mouth every eight (8) hours as needed. Quantity:  10 Tab Refills:  0 Where to Get Your Medications Information on where to get these meds will be given to you by the nurse or doctor. ! Ask your nurse or doctor about these medications  
  oxyCODONE-acetaminophen 5-325 mg per tablet

## 2017-08-29 NOTE — BRIEF OP NOTE
BRIEF OPERATIVE NOTE    Date of Procedure: 8/29/2017   Preoperative Diagnosis: Gallbladder sludge, BILIARY DYSKENSIA  Postoperative Diagnosis: Gallbladder sludge, BILIARY DYSKENSIA    Procedure(s):  LAPAROSCOPIC CHOLECYSTECTOMY WITH GRAMS  Surgeon(s) and Role:     * Mazin Das MD - Primary         Assistant Staff:       Surgical Staff:  Circ-1: Basia Hinds RN  Circ-Relief: Colonel Patient, RN  Scrub Tech-1: Shea Loll  Surg Asst-1: Claudean [place] Liggitt  Surg Asst-Relief: Juan Carlos Hamlin RN  Event Time In   Incision Start 1231   Incision Close      Anesthesia: General   Estimated Blood Loss: 30  ml  Specimens:   ID Type Source Tests Collected by Time Destination   1 : gallbladder Preservative Gallbladder  Mazin Das MD 8/29/2017 1311 Pathology      Findings: Moderate mid abdominal adhesions. No acute inflammation of the gallbladder.   Normal cholangiogram.   Complications: none  Implants: * No implants in log *    Drain - NAYAN

## 2017-08-29 NOTE — DISCHARGE INSTRUCTIONS
Discharge Instructions After Removal of Gallbladder          It is normal to tire easily for a while. No lifting over 15 pounds for one month. It is OK to walk up stairs or bend over as needed. Do not drive for 3 days and do not drive while taking narcotic pain medication. Remove any gauze dressings tomorrow. Let Dermabond (plastic coating on incisions) wear off on its own. It is OK to shower. Eat lightly today, then follow your normal diet. You may use stool softeners such as Colace or a laxative such as Miralax as needed for constipation. Take pain medicines as prescribed as needed, or use you existing hydrocodone or Advil or Tylenol. Ibuprofen (Advil) up to 800 mg by mouth every 6 hours as needed for pain may be helpful for pain control and can be taken together with, or in place of, narcotic pain medication. Reduce dose to 200 mg by mouth every 6 hours as needed for pain after 2 days. Call for follow up appointment in 2 weeks  - [phone]    If you have any problems, call Dr Per Boyer at [phone] or [phone] (after hours)        SREE Gudino MD        DISCHARGE SUMMARY from Nurse    The following personal items are in your possession at time of discharge:    Dental Appliances: None  Visual Aid: None     Home Medications: None  Jewelry: None  Clothing: Other (comment) (street clothes)  Other Valuables: None  Personal Items Sent to Safe: declined          PATIENT INSTRUCTIONS:    After general anesthesia or intravenous sedation, for 24 hours or while taking prescription Narcotics:  · Limit your activities  · Do not drive and operate hazardous machinery  · Do not make important personal or business decisions  · Do  not drink alcoholic beverages  · If you have not urinated within 8 hours after discharge, please contact your surgeon on call.     Report the following to your surgeon:  · Excessive pain, swelling, redness or odor of or around the surgical area  · Temperature over 100.5  · Nausea and vomiting lasting longer than 4 hours or if unable to take medications  · Any signs of decreased circulation or nerve impairment to extremity: change in color, persistent  numbness, tingling, coldness or increase pain  · Any questions        What to do at Home:    *  Please give a list of your current medications to your Primary Care Provider. *  Please update this list whenever your medications are discontinued, doses are      changed, or new medications (including over-the-counter products) are added. *  Please carry medication information at all times in case of emergency situations. These are general instructions for a healthy lifestyle:    No smoking/ No tobacco products/ Avoid exposure to second hand smoke    Surgeon General's Warning:  Quitting smoking now greatly reduces serious risk to your health. Obesity, smoking, and sedentary lifestyle greatly increases your risk for illness    A healthy diet, regular physical exercise & weight monitoring are important for maintaining a healthy lifestyle    You may be retaining fluid if you have a history of heart failure or if you experience any of the following symptoms:  Weight gain of 3 pounds or more overnight or 5 pounds in a week, increased swelling in our hands or feet or shortness of breath while lying flat in bed. Please call your doctor as soon as you notice any of these symptoms; do not wait until your next office visit. Recognize signs and symptoms of STROKE:    F-face looks uneven    A-arms unable to move or move unevenly    S-speech slurred or non-existent    T-time-call 911 as soon as signs and symptoms begin-DO NOT go       Back to bed or wait to see if you get better-TIME IS BRAIN. Warning Signs of HEART ATTACK     Call 911 if you have these symptoms:   Chest discomfort. Most heart attacks involve discomfort in the center of the chest that lasts more than a few minutes, or that goes away and comes back.  It can feel like uncomfortable pressure, squeezing, fullness, or pain.  Discomfort in other areas of the upper body. Symptoms can include pain or discomfort in one or both arms, the back, neck, jaw, or stomach.  Shortness of breath with or without chest discomfort.  Other signs may include breaking out in a cold sweat, nausea, or lightheadedness. Don't wait more than five minutes to call 911 - MINUTES MATTER! Fast action can save your life. Calling 911 is almost always the fastest way to get lifesaving treatment. Emergency Medical Services staff can begin treatment when they arrive -- up to an hour sooner than if someone gets to the hospital by car. The discharge information has been reviewed with the patient and caregiver. The patient and caregiver verbalized understanding. Discharge medications reviewed with the patient and caregiver and appropriate educational materials and side effects teaching were provided. A common side effect of anesthesia following surgery is nausea and/or vomiting. In order to decrease symptoms, it is wise to avoid foods that are high in fat, greasy foods, milk products, and spicy foods for the first 24 hours. Acceptable foods for the first 24 hours following surgery include but are not limited to:     soup   broth    toast    crackers    applesauce    bananas    mashed potatoes,   soft or scrambled eggs   oatmeal    jello    It is important to eat when taking your pain medication. This will help to prevent nausea. If possible, please try to time your meals with your medications. It is very important to stay hydrated following surgery. Sip fluids frequently while awake. Avoid acidic drinks such as citrus juices and soda for 24 hours. Carbonated beverages may cause bloating and gas.  Acceptable fluids include:    - water (flavor packets may add variety)  - coffee or tea (in moderation)  - Gatorade  - Osvaldo-aid  - apple juice  - cranberry juice    You are encouraged to cough and deep breathe every hour when awake. This will help to prevent respiratory complications following anesthesia. You may want to hug a pillow when coughing and sneezing to add additional support to the surgical area and to decrease discomfort if you had abdominal or chest surgery. If you are discharged home with support stockings, you may remove them after 24 hours. Support stockings are used to help prevent blood clots in the legs following surgery. Please take time to review all of your Home Care Instructions and Medication Information sheets provided in your discharge packet. If you have any questions, please contact your surgeons office. Thank you. How to Care for Your Wound After Its Treated With  DERMABOND* Topical Skin Adhesive  DERMABOND* Topical Skin Adhesive (2-octyl cyanoacrylate) is a sterile, liquid skin adhesive  that holds wound edges together. The film will usually remain in place for 5 to 10 days, then  naturally fall off your skin. The following will answer some of your questions and provide instructions for proper care for your  wound while it is healing:    CHECK WOUND APPEARANCE   Some swelling, redness, and pain are common with all wounds and normally will go away as the  wound heals. If swelling, redness, or pain increases or if the wound feels warm to the touch,  contact a doctor. Also contact a doctor if the wound edges reopen or separate. REPLACE BANDAGES   If your wound is bandaged, keep the bandage dry.  Replace the dressing daily until the adhesive film has fallen off or if the  bandage should become wet, unless otherwise instructed by your  physician.  When changing the dressing, do not place tape directly over the  DERMABOND adhesive film, because removing the tape later may also  remove the film.   AVOID TOPICAL MEDICATIONS   Do not apply liquid or ointment medications or any other product to your wound while the  DERMABOND adhesive film is in place. These may loosen the film before your wound is healed. KEEP WOUND DRY AND PROTECTED   You may occasionally and briefly wet your wound in the shower or bath. Do not soak or scrub  your wound, do not swim, and avoid periods of heavy perspiration until the DERMABOND  adhesive has naturally fallen off. After showering or bathing, gently blot your wound dry with a  soft towel. If a protective dressing is being used, apply a fresh, dry bandage, being sure to keep  the tape off the DERMABOND adhesive film.  Apply a clean, dry bandage over the wound if necessary to protect it.  Protect your wound from injury until the skin has had sufficient time to heal.   Do not scratch, rub, or pick at the DERMABOND adhesive film. This may loosen the film before  your wound is healed.  Protect the wound from prolonged exposure to sunlight or tanning lamps while the film is in  place. If you have any questions or concerns about this product, please consult your doctor. *Trademark ©[employer] 2002    Narcotic-Analgesic/Acetaminophen (By mouth)   Relieves pain. Brand Name(s): Capital w/Codeine, Raymond, Echt, [place], Lorcet HD, Lorcet Plus, Lortab 10/325, Lortab 5/325, Lortab 7.5/325, Lortab Elixir, Tiarno Torie Sopra, [place], Jin, Divina, Trezix   There may be other brand names for this medicine. When This Medicine Should Not Be Used: You should not use this medicine if you have had an allergic reaction to acetaminophen, codeine, hydrocodone, propoxyphene, or sulfites. You should not use this medicine if you have had an allergic reaction to any other opioid pain medicine. How to Use This Medicine:   Liquid, Tablet, Capsule  · Your doctor will tell you how much medicine to use. Do not use more than directed. · This medicine contains acetaminophen.  Read the labels of all other medicines you are using to see if they also contain acetaminophen, or ask your doctor or pharmacist. Wayne Denis not use more than 4 grams (4,000 milligrams) total of acetaminophen in one day. · Drink plenty of liquids to help avoid constipation. If a dose is missed:   · Some of these medicines need to be used on a fixed schedule. If you miss a dose or forget to use your medicine, call your doctor pharmacist for instructions. Do not use extra medicine to make up for a missed dose. How to Store and Dispose of This Medicine:   · Store the medicine in a closed container at room temperature, away from heat, moisture, and direct light. Do not refrigerate or freeze the medicine. · Ask your pharmacist, doctor, or health caregiver about the best way to dispose of any outdated medicine or medicine no longer needed. · Keep all medicine out of the reach of children. Never share your medicine with anyone. Drugs and Foods to Avoid:   Ask your doctor or pharmacist before using any other medicine, including over-the-counter medicines, vitamins, and herbal products. · Make sure your doctor knows if you are using a monoamine oxidase inhibitor (MAOI) medicine, such as Eldepryl®, Marplan®, [place], or Parnate®. Make sure your doctor knows if you are also using a medicine to treat depression, such as amitriptyline, doxepin, nortriptyline, Elavil®, Pamelor®, or Sinequan®. Make sure your doctor knows if you are taking an anticholinergic medicine, such as atropine, methscopolamine, or scopolamine. · Tell your doctor if you use anything else that makes you sleepy. Some examples are allergy medicine, narcotic pain medicine, and alcohol. · Do not drink alcohol while you are using this medicine. Warnings While Using This Medicine:   · Make sure your doctor knows if you are pregnant or breast feeding, or if you have a head injury, or other conditions that may cause an increase in intercranial pressure (pressure inside your head).  Make sure your doctor knows if you have severe kidney problems or severe liver problems, or if you have hypothyroidism (lack of thyroid function). Make sure your doctor knows if you have [place]'s disease (adrenal gland disease), or if you have enlarged prostate or urethral stricture. Make sure your doctor knows if you have any abdominal problems, or if you have lung disease or asthma. · This medicine may make you dizzy or drowsy. Avoid driving, using machines, or anything else that could be dangerous if you are not alert. · This medicine can be habit-forming. Do not use more than your prescribed dose. Call your doctor if you think your medicine is not working. · Tell any doctor or dentist who treats you that you are using this medicine. This medicine may affect certain medical test results. · This medicine may cause constipation, especially with long-term use. Ask your doctor if you should use a laxative to prevent and treat constipation. · When a mother is breastfeeding and takes codeine, there is a very small chance that this medicine could cause serious side effects in the baby. This is because codeine works differently in a few women, so their breast milk contains too much medicine. If you take codeine, be alert for these signs of overdose in your nursing baby: sleeping more than usual, trouble breastfeeding, trouble breathing, or being limp and weak. Call the baby's doctor right away if you think there is a problem. If you cannot talk to the doctor, take the baby to the emergency room or call 911. Possible Side Effects While Using This Medicine:   Call your doctor right away if you notice any of these side effects:  · Allergic reaction: Itching or hives, swelling in your face or hands, swelling or tingling in your mouth or throat, chest tightness, trouble breathing  · Dizziness. · Seeing or hearing things that are not there. · Very slow heartbeat. If you notice these less serious side effects, talk with your doctor:   · Change in how much or how often you urinate. · Cold, clammy skin.   · Feeling unusually anxious, excited, fearful, or tired. · Nausea, vomiting, constipation, stomach pain or upset, or heartburn. · Skin rash. · Vision changes. If you notice other side effects that you think are caused by this medicine, tell your doctor. Call your doctor for medical advice about side effects. You may report side effects to FDA at 9-275-FDA-9167  © 2017 [address] [employer] Information is for End User's use only and may not be sold, redistributed or otherwise used for commercial purposes. The above information is an  only. It is not intended as medical advice for individual conditions or treatments. Talk to your doctor, nurse or pharmacist before following any medical regimen to see if it is safe and effective for you.

## 2017-09-14 ENCOUNTER — OFFICE VISIT (OUTPATIENT)
Dept: SURGERY | Age: 52
End: 2017-09-14

## 2017-09-14 VITALS
RESPIRATION RATE: 20 BRPM | SYSTOLIC BLOOD PRESSURE: 125 MMHG | HEIGHT: 68 IN | HEART RATE: 88 BPM | OXYGEN SATURATION: 97 % | DIASTOLIC BLOOD PRESSURE: 86 MMHG | BODY MASS INDEX: 21.82 KG/M2 | WEIGHT: 144 LBS

## 2017-09-14 DIAGNOSIS — Z09 POSTOPERATIVE EXAMINATION: Primary | ICD-10-CM

## 2017-09-14 NOTE — PROGRESS NOTES
Surgery    The patient is s/p laparoscopic cholecystectomy and cholangiogram on 8/29/2017    The patient reports doing well with full relief of symptoms. On examination, the patient is in no distress. The patient's abdomen is soft. Incision sites are healing well. The pathology report showed no stones. He had preop diagnosis of biliary dyskinesia. The patient is doing well and can follow up prn.     Cheikh Rojas MD

## 2017-09-20 PROBLEM — K82.8 GALLBLADDER SLUDGE: Status: RESOLVED | Noted: 2017-08-24 | Resolved: 2017-09-20

## 2017-09-20 PROBLEM — R10.11 RUQ PAIN: Status: RESOLVED | Noted: 2017-08-16 | Resolved: 2017-09-20

## 2017-09-20 PROBLEM — K82.8 BILIARY DYSKINESIA: Status: RESOLVED | Noted: 2017-08-24 | Resolved: 2017-09-20

## 2017-09-20 PROBLEM — Z90.49 S/P LAPAROSCOPIC CHOLECYSTECTOMY: Status: ACTIVE | Noted: 2017-09-20

## 2022-03-18 PROBLEM — Z90.49 S/P LAPAROSCOPIC CHOLECYSTECTOMY: Status: ACTIVE | Noted: 2017-09-20

## 2022-03-19 PROBLEM — J90 PLEURAL EFFUSION, LEFT: Status: ACTIVE | Noted: 2017-06-17

## 2022-03-19 PROBLEM — R79.89 D-DIMER, ELEVATED: Status: ACTIVE | Noted: 2017-06-15

## 2024-04-17 ENCOUNTER — HOSPITAL ENCOUNTER (EMERGENCY)
Facility: HOSPITAL | Age: 59
Discharge: HOME | End: 2024-04-17
Payer: COMMERCIAL

## 2024-04-17 ENCOUNTER — APPOINTMENT (OUTPATIENT)
Facility: HOSPITAL | Age: 59
End: 2024-04-17
Payer: COMMERCIAL

## 2024-04-17 VITALS
TEMPERATURE: 98 F | OXYGEN SATURATION: 99 % | BODY MASS INDEX: 24.22 KG/M2 | DIASTOLIC BLOOD PRESSURE: 64 MMHG | SYSTOLIC BLOOD PRESSURE: 150 MMHG | HEIGHT: 68 IN | HEART RATE: 58 BPM | WEIGHT: 159.83 LBS | RESPIRATION RATE: 19 BRPM

## 2024-04-17 DIAGNOSIS — K21.9 GASTROESOPHAGEAL REFLUX DISEASE, UNSPECIFIED WHETHER ESOPHAGITIS PRESENT: ICD-10-CM

## 2024-04-17 DIAGNOSIS — R07.9 CHEST PAIN, UNSPECIFIED TYPE: Primary | ICD-10-CM

## 2024-04-17 LAB
ALBUMIN SERPL-MCNC: 4.2 G/DL (ref 3.5–5)
ALBUMIN/GLOB SERPL: 1.4 (ref 1.1–2.2)
ALP SERPL-CCNC: 56 U/L (ref 45–117)
ALT SERPL-CCNC: 50 U/L (ref 12–78)
ANION GAP SERPL CALC-SCNC: 3 MMOL/L (ref 5–15)
AST SERPL-CCNC: 35 U/L (ref 15–37)
BASOPHILS # BLD: 0.1 K/UL (ref 0–0.1)
BASOPHILS NFR BLD: 1 % (ref 0–1)
BILIRUB SERPL-MCNC: 0.7 MG/DL (ref 0.2–1)
BUN SERPL-MCNC: 15 MG/DL (ref 6–20)
BUN/CREAT SERPL: 14 (ref 12–20)
CALCIUM SERPL-MCNC: 9.3 MG/DL (ref 8.5–10.1)
CHLORIDE SERPL-SCNC: 106 MMOL/L (ref 97–108)
CO2 SERPL-SCNC: 30 MMOL/L (ref 21–32)
CREAT SERPL-MCNC: 1.05 MG/DL (ref 0.7–1.3)
D DIMER PPP FEU-MCNC: 0.22 MG/L FEU (ref 0–0.65)
DIFFERENTIAL METHOD BLD: ABNORMAL
EKG ATRIAL RATE: 66 BPM
EKG DIAGNOSIS: NORMAL
EKG P AXIS: 67 DEGREES
EKG P-R INTERVAL: 152 MS
EKG Q-T INTERVAL: 376 MS
EKG QRS DURATION: 82 MS
EKG QTC CALCULATION (BAZETT): 394 MS
EKG R AXIS: 60 DEGREES
EKG T AXIS: 62 DEGREES
EKG VENTRICULAR RATE: 66 BPM
EOSINOPHIL # BLD: 0.3 K/UL (ref 0–0.4)
EOSINOPHIL NFR BLD: 2 % (ref 0–7)
ERYTHROCYTE [DISTWIDTH] IN BLOOD BY AUTOMATED COUNT: 13.2 % (ref 11.5–14.5)
GLOBULIN SER CALC-MCNC: 3.1 G/DL (ref 2–4)
GLUCOSE SERPL-MCNC: 102 MG/DL (ref 65–100)
HCT VFR BLD AUTO: 39 % (ref 36.6–50.3)
HGB BLD-MCNC: 12.8 G/DL (ref 12.1–17)
IMM GRANULOCYTES # BLD AUTO: 0 K/UL (ref 0–0.04)
IMM GRANULOCYTES NFR BLD AUTO: 0 % (ref 0–0.5)
LYMPHOCYTES # BLD: 3.4 K/UL (ref 0.8–3.5)
LYMPHOCYTES NFR BLD: 29 % (ref 12–49)
MAGNESIUM SERPL-MCNC: 1.9 MG/DL (ref 1.6–2.4)
MCH RBC QN AUTO: 30.8 PG (ref 26–34)
MCHC RBC AUTO-ENTMCNC: 32.8 G/DL (ref 30–36.5)
MCV RBC AUTO: 93.8 FL (ref 80–99)
MONOCYTES # BLD: 0.9 K/UL (ref 0–1)
MONOCYTES NFR BLD: 8 % (ref 5–13)
NEUTS SEG # BLD: 7.1 K/UL (ref 1.8–8)
NEUTS SEG NFR BLD: 60 % (ref 32–75)
NRBC # BLD: 0 K/UL (ref 0–0.01)
NRBC BLD-RTO: 0 PER 100 WBC
PLATELET # BLD AUTO: 293 K/UL (ref 150–400)
PMV BLD AUTO: 9.5 FL (ref 8.9–12.9)
POTASSIUM SERPL-SCNC: 4.2 MMOL/L (ref 3.5–5.1)
PROT SERPL-MCNC: 7.3 G/DL (ref 6.4–8.2)
RBC # BLD AUTO: 4.16 M/UL (ref 4.1–5.7)
SODIUM SERPL-SCNC: 139 MMOL/L (ref 136–145)
TROPONIN I SERPL HS-MCNC: 4 NG/L (ref 0–76)
TROPONIN I SERPL HS-MCNC: 4 NG/L (ref 0–76)
WBC # BLD AUTO: 11.8 K/UL (ref 4.1–11.1)

## 2024-04-17 PROCEDURE — 83735 ASSAY OF MAGNESIUM: CPT

## 2024-04-17 PROCEDURE — 85025 COMPLETE CBC W/AUTO DIFF WBC: CPT

## 2024-04-17 PROCEDURE — 36415 COLL VENOUS BLD VENIPUNCTURE: CPT

## 2024-04-17 PROCEDURE — 85379 FIBRIN DEGRADATION QUANT: CPT

## 2024-04-17 PROCEDURE — 71045 X-RAY EXAM CHEST 1 VIEW: CPT

## 2024-04-17 PROCEDURE — 99285 EMERGENCY DEPT VISIT HI MDM: CPT

## 2024-04-17 PROCEDURE — 6370000000 HC RX 637 (ALT 250 FOR IP)

## 2024-04-17 PROCEDURE — 84484 ASSAY OF TROPONIN QUANT: CPT

## 2024-04-17 PROCEDURE — 80053 COMPREHEN METABOLIC PANEL: CPT

## 2024-04-17 RX ADMIN — ALUMINUM HYDROXIDE, MAGNESIUM HYDROXIDE, AND SIMETHICONE 40 ML: 1200; 120; 1200 SUSPENSION ORAL at 16:52

## 2024-04-17 ASSESSMENT — PAIN DESCRIPTION - ORIENTATION: ORIENTATION: LEFT

## 2024-04-17 ASSESSMENT — PAIN SCALES - GENERAL: PAINLEVEL_OUTOF10: 4

## 2024-04-17 ASSESSMENT — PAIN DESCRIPTION - LOCATION: LOCATION: CHEST

## 2024-04-17 ASSESSMENT — PAIN DESCRIPTION - DESCRIPTORS: DESCRIPTORS: ACHING

## 2024-04-17 NOTE — DISCHARGE INSTRUCTIONS
Please take over-the-counter Prilosec daily for the 2 weeks.  Follow-up with cardiologist and primary care.

## 2024-04-17 NOTE — ED PROVIDER NOTES
higher level of care.     I utilized an evidence-based risk rating tool (CMT) along with my training and experience to weigh the risk of discharge against the risks of further testing, imaging, or hospitalization. At this time, I estimate the risks of additional testing, imaging, or hospitalization to be equal to or greater than the risk of discharge(in the case of discharge home).      The patient's HEART Score is 2. In rare cases, I give patients with HEART Score of 4 the option of discharge, but only when they meet criteria for \"Low 4,\" meaning that HST was used, and the 4 is not from a highly suspicious story, highly suspicious EKG, or positive cardiac enzymes.  In these selected cases, the risk of a \"Low 4\" is still most likely lower than the risk of admission and further testing/imaging. [id number]    SHARED DECISION MAKING:   I discussed my risk assessment with the patient. The patient understands and consents to the risk of disposition/plan, as well as the risk of uncertainty in estimating outcomes. [id number]          FINAL IMPRESSION     1. Chest pain, unspecified type    2. Gastroesophageal reflux disease, unspecified whether esophagitis present          DISPOSITION/PLAN   DISPOSITION Decision To Discharge 04/17/2024 05:35:17 PM    Discharge Note: The patient is stable for discharge home. The signs, symptoms, diagnosis, and discharge instructions have been discussed, understanding conveyed, and agreed upon. The patient is to follow up as recommended or return to ER should their symptoms worsen.      PATIENT REFERRED TO:  Taz Snow MD  [address] [place] [address] [id number] [phone]    Schedule an appointment as soon as possible for a visit       [facility]  [address]  [facility] [address]  [facility] [address]  Schedule an appointment as soon as possible for a visit       [facility]  [address] [facility]

## (undated) DEVICE — NEEDLE HYPO 25GA L1.5IN BVL ORIENTED ECLIPSE

## (undated) DEVICE — INFECTION CONTROL KIT SYS

## (undated) DEVICE — DERMABOND SKIN ADH 0.7ML -- DERMABOND ADVANCED 12/BX

## (undated) DEVICE — BLADELESS OPTICAL TROCAR WITH FIXATION CANNULA: Brand: VERSAPORT

## (undated) DEVICE — KENDALL SCD EXPRESS SLEEVES, KNEE LENGTH, MEDIUM: Brand: KENDALL SCD

## (undated) DEVICE — CLIP APPLIER WITH CLIP LOGIC TECHNOLOGY: Brand: ENDO CLIP III

## (undated) DEVICE — UNIVERSAL FIXATION CANNULA: Brand: VERSAONE

## (undated) DEVICE — DEVON™ KNEE AND BODY STRAP 60" X 3" (1.5 M X 7.6 CM): Brand: DEVON

## (undated) DEVICE — (D)PREP SKN CHLRAPRP APPL 26ML -- CONVERT TO ITEM 371833

## (undated) DEVICE — HANDLE LT SNAP ON ULT DURABLE LENS FOR TRUMPF ALC DISPOSABLE

## (undated) DEVICE — ELECTRODE ES SHFT L29CM HK OD5MM ENDOPATH PRB + II

## (undated) DEVICE — TUBING IRRIG PRESSURIZED BG SET SPIK PRB + II

## (undated) DEVICE — SUT ETHLN 3-0 18IN PS2 BLK --

## (undated) DEVICE — 1200 GUARD II KIT W/5MM TUBE W/O VAC TUBE: Brand: GUARDIAN

## (undated) DEVICE — SPECIMEN RETRIEVAL POUCH: Brand: ENDO CATCH GOLD

## (undated) DEVICE — SUTURE VCRL SZ 4-0 L27IN ABSRB UD L19MM PS-2 3/8 CIR PRIM J426H

## (undated) DEVICE — HEMADUCT 7MM FLAT, FULL DUCT: Brand: CARDINAL HEALTH

## (undated) DEVICE — SUTURE SZ 0 27IN 5/8 CIR UR-6  TAPER PT VIOLET ABSRB VICRYL J603H

## (undated) DEVICE — BLADELESS OPTICAL TROCAR WITH FIXATION CANNULA: Brand: VERSAONE

## (undated) DEVICE — SYRINGE MED 20ML STD CLR PLAS LUERLOCK TIP N CTRL DISP

## (undated) DEVICE — STERILE POLYISOPRENE POWDER-FREE SURGICAL GLOVES: Brand: PROTEXIS

## (undated) DEVICE — Device

## (undated) DEVICE — REM POLYHESIVE ADULT PATIENT RETURN ELECTRODE: Brand: VALLEYLAB

## (undated) DEVICE — SURGICAL PROCEDURE KIT GEN LAPAROSCOPY LF

## (undated) DEVICE — HANDLE PRB DIA5MM HND CTRL PSTL GRP ENDOPATH PRB + II